# Patient Record
Sex: FEMALE | Race: WHITE | NOT HISPANIC OR LATINO | Employment: UNEMPLOYED | URBAN - METROPOLITAN AREA
[De-identification: names, ages, dates, MRNs, and addresses within clinical notes are randomized per-mention and may not be internally consistent; named-entity substitution may affect disease eponyms.]

---

## 2023-09-08 ENCOUNTER — HOSPITAL ENCOUNTER (OUTPATIENT)
Dept: DATA CONVERSION | Facility: HOSPITAL | Age: 53
Discharge: HOME | End: 2023-09-08

## 2023-09-08 DIAGNOSIS — Z86.73 PERSONAL HISTORY OF TRANSIENT ISCHEMIC ATTACK (TIA), AND CEREBRAL INFARCTION WITHOUT RESIDUAL DEFICITS: ICD-10-CM
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
## 2023-09-08 DIAGNOSIS — S99.912A UNSPECIFIED INJURY OF LEFT ANKLE, INITIAL ENCOUNTER: ICD-10-CM

## 2023-09-08 DIAGNOSIS — S93.402A SPRAIN OF UNSPECIFIED LIGAMENT OF LEFT ANKLE, INITIAL ENCOUNTER: ICD-10-CM

## 2023-09-08 DIAGNOSIS — L53.9 ERYTHEMATOUS CONDITION, UNSPECIFIED: ICD-10-CM

## 2024-01-15 ENCOUNTER — HOSPITAL ENCOUNTER (EMERGENCY)
Facility: HOSPITAL | Age: 54
Discharge: HOME | End: 2024-01-15
Attending: STUDENT IN AN ORGANIZED HEALTH CARE EDUCATION/TRAINING PROGRAM
Payer: MEDICARE

## 2024-01-15 ENCOUNTER — APPOINTMENT (OUTPATIENT)
Dept: RADIOLOGY | Facility: HOSPITAL | Age: 54
End: 2024-01-15
Payer: MEDICARE

## 2024-01-15 VITALS
SYSTOLIC BLOOD PRESSURE: 144 MMHG | WEIGHT: 260 LBS | OXYGEN SATURATION: 99 % | DIASTOLIC BLOOD PRESSURE: 78 MMHG | HEIGHT: 62 IN | TEMPERATURE: 97.9 F | BODY MASS INDEX: 47.84 KG/M2 | HEART RATE: 78 BPM | RESPIRATION RATE: 17 BRPM
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
## 2024-01-15 DIAGNOSIS — Z86.73 HISTORY OF STROKE: ICD-10-CM

## 2024-01-15 DIAGNOSIS — S09.90XA CLOSED HEAD INJURY, INITIAL ENCOUNTER: Primary | ICD-10-CM

## 2024-01-15 PROCEDURE — 70450 CT HEAD/BRAIN W/O DYE: CPT

## 2024-01-15 PROCEDURE — 99284 EMERGENCY DEPT VISIT MOD MDM: CPT | Mod: 25 | Performed by: STUDENT IN AN ORGANIZED HEALTH CARE EDUCATION/TRAINING PROGRAM

## 2024-01-15 ASSESSMENT — LIFESTYLE VARIABLES
EVER FELT BAD OR GUILTY ABOUT YOUR DRINKING: NO
HAVE YOU EVER FELT YOU SHOULD CUT DOWN ON YOUR DRINKING: NO
HAVE PEOPLE ANNOYED YOU BY CRITICIZING YOUR DRINKING: NO
EVER HAD A DRINK FIRST THING IN THE MORNING TO STEADY YOUR NERVES TO GET RID OF A HANGOVER: NO

## 2024-01-15 ASSESSMENT — PAIN SCALES - GENERAL
PAINLEVEL_OUTOF10: 3
PAINLEVEL_OUTOF10: 3

## 2024-01-15 ASSESSMENT — PAIN - FUNCTIONAL ASSESSMENT
PAIN_FUNCTIONAL_ASSESSMENT: 0-10
PAIN_FUNCTIONAL_ASSESSMENT: 0-10

## 2024-01-15 ASSESSMENT — PAIN DESCRIPTION - LOCATION
LOCATION: FINGER (COMMENT WHICH ONE)
LOCATION: FINGER (COMMENT WHICH ONE)

## 2024-01-15 ASSESSMENT — COLUMBIA-SUICIDE SEVERITY RATING SCALE - C-SSRS
2. HAVE YOU ACTUALLY HAD ANY THOUGHTS OF KILLING YOURSELF?: NO
6. HAVE YOU EVER DONE ANYTHING, STARTED TO DO ANYTHING, OR PREPARED TO DO ANYTHING TO END YOUR LIFE?: NO
1. IN THE PAST MONTH, HAVE YOU WISHED YOU WERE DEAD OR WISHED YOU COULD GO TO SLEEP AND NOT WAKE UP?: NO

## 2024-01-15 ASSESSMENT — PAIN DESCRIPTION - ORIENTATION: ORIENTATION: RIGHT

## 2024-01-16 NOTE — ED PROVIDER NOTES
HPI   Chief Complaint   Patient presents with    Fall         This is a 53-year-old female with past medical history of  CVA years ago with persisting left-sided weakness presenting to the emergency department after a fall.   Patient states that she was in her kitchen and was trying to feed her cat.  She states that she mechanically tripped over her walker and fell back.  She states on the way down she hit the back of her head  on a ledge.  She states that she has no loss of consciousness.  She denies dizziness or lightheadedness prior to the fall.  She does take aspirin 325 mg daily.  Due to her left-sided weakness that is not new, she could not get up after the fall.  She typically uses a walker at home.  When her  came home and saw that she could not get up, he did call EMS.  EMS recommended patient come to the emergency department due to hitting her head and taking aspirin.  Patient states that she has no pain currently.  She does not have any headaches, visual disturbances, difficulty speaking, or pain in her upper or lower extremities bilaterally.  No back pain, chest pain, or abdominal pain.  She states that she was on the ground for an hour or so.          Please see HPI for pertinent positive and negative ROS.                   Nhi Coma Scale Score: 15                  Patient History   Past Medical History:   Diagnosis Date    Stroke (CMS/[facility])      History reviewed. No pertinent surgical history.  No family history on file.  Social History     Tobacco Use    Smoking status: Never    Smokeless tobacco: Never   Substance Use Topics    Alcohol use: Yes    Drug use: Never       Physical Exam   ED Triage Vitals [01/15/24 1936]   Temp Heart Rate Resp BP   36.6 °C (97.9 °F) 95 20 136/90      SpO2 Temp Source Heart Rate Source Patient Position   97 % Oral Monitor Sitting      BP Location FiO2 (%)     Right arm --       Physical Exam  GENERAL APPEARANCE: Awake and alert. No acute distress.   VITAL SIGNS:  As per the nurses' triage record.  HEENT: Normocephalic, atraumatic. Extraocular muscles are intact. Conjunctiva are pink. Negative scleral icterus. Mucous membranes are moist. Tongue in the midline. Oropharynx clear, uvula midline.   Orbital bones grossly intact without ecchymosis or tenderness to palpation bilaterally. TM grey and intact bilateral without hemotympanum. No nasal gross abnormalities. No septal hematoma.  No midface instability.  No facial abrasions.  NECK: Soft, nontender and supple, full gross ROM, no meningeal signs. No tenderness to palpation over cervical spinous processes.  No bony step-offs appreciated.  No cervical paraspinal muscle tenderness bilaterally.   CHEST: Nontender to palpation. Clear to auscultation bilaterally. No rales, rhonchi, or wheezing. Symmetric rise and fall of chest wall.   HEART: Clear S1 and S2. Regular rate and rhythm. No murmurs appreciated on auscultation.  Strong and equal pulses in the extremities.  ABDOMEN: Soft, nontender, nondistended, positive bowel sounds, no palpable masses.  MUSCULOSKELETAL: The calves are nontender to palpation. Full gross active range of motion.   No tenderness palpation over thoracic or lumbar paraspinal muscles.  No tenderness palpation over thoracic or lumbar midline.  No bony step-offs appreciated on palpitation.  NEUROLOGICAL: Awake, alert and oriented x 3. Motor power intact in the upper and lower extremities. Sensation is intact to light touch in the upper and lower extremities. Patient answering questions appropriately.   Cranial nerves II through XII grossly intact bilaterally.  IMMUNOLOGICAL: No lymphatic streaking noted  DERMATOLOGIC: Warm and dry without petechiae, rashes, or ecchymosis noted on visible skin.   PYSCH: Cooperative with appropriate mood and affect.  ED Course & MDM   ED Course as of 01/15/24 2128   Mon Chet 15, 2024   2001 CT head wo IV contrast  IMPRESSION:  Status post bilateral parietal craniotomies with  encephalomalacia  seen bilaterally unchanged since the prior study.      No acute intracranial process.   [JM]      ED Course User Index  [ORA] Rosita Mendoza MD         Diagnoses as of 01/15/24 2128   Fall, initial encounter   Closed head injury, initial encounter   History of stroke       Medical Decision Making  Parts of this chart have been completed using voice recognition software. Please excuse any errors of transcription.  My thought process and reason for plan has been formulated from the time that I saw the patient until the time of disposition and is not specific to one specific moment during their visit and furthermore my MDM encompasses this entire chart and not only this text box.      HPI: Detailed above.    Exam: A medically appropriate exam performed, outlined above, given the known history and presentation.    History obtained from:   Patient    Social Determinants of Health considered during this visit:  lives at home  with her .    Medications given during visit:  Medications - No data to display     Diagnostic/tests  Labs Reviewed - No data to display   CT head wo IV contrast   Final Result   Status post bilateral parietal craniotomies with encephalomalacia   seen bilaterally unchanged since the prior study.        No acute intracranial process.        Signed by: Adela Spain 1/15/2024 7:44 PM   Dictation workstation:   [id number]           Considerations/further MDM:    Patient was seen in conjucntion with my supervising physician,  Dr. Sandoval. Please refer to her note.      I have considered and evaluated for intracranial hemorrhage, subdural hematoma, epidural hematoma, laceration of the scalp.   Patient also had pain over her right index finger.  She has full flexion and extension of right finger without any pain.  With palpation over the right index finger there is no tenderness to palpation.  There is no ecchymosis or swelling.  She states it feels like it is swollen  but it is not painful when I touch it.  Do not suspect fracture, no indication for x-ray at this time.      CT scan of head without IV contrast shows no acute intracranial process.  Status post bilateral parietal craniotomies with encephalomalacia seen bilaterally unchanged from prior study.  Patient is aware and she has had craniotomy before.   She is neurologically intact.  She has no head pain at time of assessment.  She has no neck pain.  She feels comfortable with being discharged home.  She states that she came due to EMS recommending her to come to the emergency department due to her taking aspirin and hitting her head during the mechanical fall.   Patient was discharged with instructions follow-up with her primary care doctor.  She was told to return to the emergency department immediately with new or worsening symptoms with the onset of new symptoms.    Procedure  Procedures     Zoe Rose PA-C  01/15/24 2128

## 2024-01-16 NOTE — DISCHARGE INSTRUCTIONS
Seen in the emergency department today for a fall.  Your head scan today was negative for any bleeding.  I recommend that you follow-up with your primary care doctor for recheck as needed.

## 2025-03-07 ENCOUNTER — APPOINTMENT (OUTPATIENT)
Dept: RADIOLOGY | Facility: HOSPITAL | Age: 55
End: 2025-03-07
Payer: MEDICARE

## 2025-03-07 ENCOUNTER — HOSPITAL ENCOUNTER (OUTPATIENT)
Facility: HOSPITAL | Age: 55
Setting detail: OBSERVATION
Discharge: HOME | End: 2025-03-12
Attending: STUDENT IN AN ORGANIZED HEALTH CARE EDUCATION/TRAINING PROGRAM | Admitting: INTERNAL MEDICINE
Payer: MEDICARE

## 2025-03-07 DIAGNOSIS — K59.00 CONSTIPATION, UNSPECIFIED CONSTIPATION TYPE: ICD-10-CM
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
## 2025-03-07 DIAGNOSIS — R53.1 WEAKNESS: ICD-10-CM

## 2025-03-07 DIAGNOSIS — I10 HYPERTENSION, UNSPECIFIED TYPE: ICD-10-CM

## 2025-03-07 DIAGNOSIS — F41.9 ANXIETY: ICD-10-CM

## 2025-03-07 DIAGNOSIS — S09.90XA CLOSED HEAD INJURY, INITIAL ENCOUNTER: ICD-10-CM

## 2025-03-07 LAB
ALBUMIN SERPL BCP-MCNC: 3.9 G/DL (ref 3.4–5)
ALP SERPL-CCNC: 82 U/L (ref 33–110)
ALT SERPL W P-5'-P-CCNC: 26 U/L (ref 7–45)
ANION GAP SERPL CALCULATED.3IONS-SCNC: 13 MMOL/L (ref 10–20)
APPEARANCE UR: CLEAR
AST SERPL W P-5'-P-CCNC: 15 U/L (ref 9–39)
BASOPHILS # BLD AUTO: 0.05 X10*3/UL (ref 0–0.1)
BASOPHILS NFR BLD AUTO: 0.5 %
BILIRUB SERPL-MCNC: 0.4 MG/DL (ref 0–1.2)
BILIRUB UR STRIP.AUTO-MCNC: NEGATIVE MG/DL
BUN SERPL-MCNC: 12 MG/DL (ref 6–23)
CALCIUM SERPL-MCNC: 9.1 MG/DL (ref 8.6–10.3)
CHLORIDE SERPL-SCNC: 104 MMOL/L (ref 98–107)
CO2 SERPL-SCNC: 25 MMOL/L (ref 21–32)
COLOR UR: NORMAL
CREAT SERPL-MCNC: 0.88 MG/DL (ref 0.5–1.05)
EGFRCR SERPLBLD CKD-EPI 2021: 79 ML/MIN/1.73M*2
EOSINOPHIL # BLD AUTO: 0.2 X10*3/UL (ref 0–0.7)
EOSINOPHIL NFR BLD AUTO: 2 %
ERYTHROCYTE [DISTWIDTH] IN BLOOD BY AUTOMATED COUNT: 15.8 % (ref 11.5–14.5)
GLUCOSE SERPL-MCNC: 88 MG/DL (ref 74–99)
GLUCOSE UR STRIP.AUTO-MCNC: NORMAL MG/DL
HCT VFR BLD AUTO: 39.4 % (ref 36–46)
HGB BLD-MCNC: 13.1 G/DL (ref 12–16)
IMM GRANULOCYTES # BLD AUTO: 0.02 X10*3/UL (ref 0–0.7)
IMM GRANULOCYTES NFR BLD AUTO: 0.2 % (ref 0–0.9)
KETONES UR STRIP.AUTO-MCNC: NEGATIVE MG/DL
LEUKOCYTE ESTERASE UR QL STRIP.AUTO: NEGATIVE
LYMPHOCYTES # BLD AUTO: 2.71 X10*3/UL (ref 1.2–4.8)
LYMPHOCYTES NFR BLD AUTO: 27.7 %
MAGNESIUM SERPL-MCNC: 2.01 MG/DL (ref 1.6–2.4)
MCH RBC QN AUTO: 30.4 PG (ref 26–34)
MCHC RBC AUTO-ENTMCNC: 33.2 G/DL (ref 32–36)
MCV RBC AUTO: 91 FL (ref 80–100)
MONOCYTES # BLD AUTO: 0.83 X10*3/UL (ref 0.1–1)
MONOCYTES NFR BLD AUTO: 8.5 %
NEUTROPHILS # BLD AUTO: 5.98 X10*3/UL (ref 1.2–7.7)
NEUTROPHILS NFR BLD AUTO: 61.1 %
NITRITE UR QL STRIP.AUTO: NEGATIVE
NRBC BLD-RTO: 0 /100 WBCS (ref 0–0)
PH UR STRIP.AUTO: 6 [PH]
PLATELET # BLD AUTO: 309 X10*3/UL (ref 150–450)
POTASSIUM SERPL-SCNC: 4.2 MMOL/L (ref 3.5–5.3)
PROT SERPL-MCNC: 7 G/DL (ref 6.4–8.2)
PROT UR STRIP.AUTO-MCNC: NEGATIVE MG/DL
RBC # BLD AUTO: 4.31 X10*6/UL (ref 4–5.2)
RBC # UR STRIP.AUTO: NEGATIVE MG/DL
SODIUM SERPL-SCNC: 138 MMOL/L (ref 136–145)
SP GR UR STRIP.AUTO: 1.01
UROBILINOGEN UR STRIP.AUTO-MCNC: NORMAL MG/DL
WBC # BLD AUTO: 9.8 X10*3/UL (ref 4.4–11.3)

## 2025-03-07 PROCEDURE — 71045 X-RAY EXAM CHEST 1 VIEW: CPT | Mod: FOREIGN READ | Performed by: RADIOLOGY

## 2025-03-07 PROCEDURE — 70450 CT HEAD/BRAIN W/O DYE: CPT | Performed by: RADIOLOGY

## 2025-03-07 PROCEDURE — 70450 CT HEAD/BRAIN W/O DYE: CPT

## 2025-03-07 PROCEDURE — 85025 COMPLETE CBC W/AUTO DIFF WBC: CPT

## 2025-03-07 PROCEDURE — 83735 ASSAY OF MAGNESIUM: CPT

## 2025-03-07 PROCEDURE — 99285 EMERGENCY DEPT VISIT HI MDM: CPT | Mod: 25 | Performed by: STUDENT IN AN ORGANIZED HEALTH CARE EDUCATION/TRAINING PROGRAM

## 2025-03-07 PROCEDURE — G0378 HOSPITAL OBSERVATION PER HR: HCPCS

## 2025-03-07 PROCEDURE — 72170 X-RAY EXAM OF PELVIS: CPT | Mod: FOREIGN READ | Performed by: RADIOLOGY

## 2025-03-07 PROCEDURE — 71045 X-RAY EXAM CHEST 1 VIEW: CPT

## 2025-03-07 PROCEDURE — 36415 COLL VENOUS BLD VENIPUNCTURE: CPT

## 2025-03-07 PROCEDURE — 72170 X-RAY EXAM OF PELVIS: CPT

## 2025-03-07 PROCEDURE — 80053 COMPREHEN METABOLIC PANEL: CPT

## 2025-03-07 PROCEDURE — 81003 URINALYSIS AUTO W/O SCOPE: CPT

## 2025-03-07 RX ORDER — BETAINE 1 G/G
POWDER, FOR SOLUTION ORAL 2 TIMES DAILY
COMMUNITY
Start: 2024-06-07

## 2025-03-07 RX ORDER — GUAIFENESIN 600 MG/1
600 TABLET, EXTENDED RELEASE ORAL EVERY 12 HOURS PRN
Status: DISCONTINUED | OUTPATIENT
Start: 2025-03-07 | End: 2025-03-12 | Stop reason: HOSPADM

## 2025-03-07 RX ORDER — LANOLIN ALCOHOL/MO/W.PET/CERES
300 CREAM (GRAM) TOPICAL DAILY
Status: DISCONTINUED | OUTPATIENT
Start: 2025-03-08 | End: 2025-03-12 | Stop reason: HOSPADM

## 2025-03-07 RX ORDER — CARVEDILOL 12.5 MG/1
12.5 TABLET ORAL 2 TIMES DAILY
Status: DISCONTINUED | OUTPATIENT
Start: 2025-03-07 | End: 2025-03-12 | Stop reason: HOSPADM

## 2025-03-07 RX ORDER — GUAIFENESIN/DEXTROMETHORPHAN 100-10MG/5
5 SYRUP ORAL EVERY 4 HOURS PRN
Status: DISCONTINUED | OUTPATIENT
Start: 2025-03-07 | End: 2025-03-12 | Stop reason: HOSPADM

## 2025-03-07 RX ORDER — HEPARIN SODIUM 5000 [USP'U]/ML
7500 INJECTION, SOLUTION INTRAVENOUS; SUBCUTANEOUS EVERY 8 HOURS SCHEDULED
Status: DISCONTINUED | OUTPATIENT
Start: 2025-03-07 | End: 2025-03-12 | Stop reason: HOSPADM

## 2025-03-07 RX ORDER — BUSPIRONE HYDROCHLORIDE 10 MG/1
10 TABLET ORAL 2 TIMES DAILY
COMMUNITY
Start: 2024-03-04

## 2025-03-07 RX ORDER — TALC
6 POWDER (GRAM) TOPICAL NIGHTLY
Status: DISCONTINUED | OUTPATIENT
Start: 2025-03-07 | End: 2025-03-12 | Stop reason: HOSPADM

## 2025-03-07 RX ORDER — FLUTICASONE PROPIONATE 50 MCG
1 SPRAY, SUSPENSION (ML) NASAL EVERY 12 HOURS PRN
Status: DISCONTINUED | OUTPATIENT
Start: 2025-03-07 | End: 2025-03-12 | Stop reason: HOSPADM

## 2025-03-07 RX ORDER — LAMOTRIGINE 100 MG/1
100 TABLET ORAL NIGHTLY
Status: DISCONTINUED | OUTPATIENT
Start: 2025-03-07 | End: 2025-03-12 | Stop reason: HOSPADM

## 2025-03-07 RX ORDER — ACETAMINOPHEN 650 MG/1
650 SUPPOSITORY RECTAL EVERY 4 HOURS PRN
Status: DISCONTINUED | OUTPATIENT
Start: 2025-03-07 | End: 2025-03-12 | Stop reason: HOSPADM

## 2025-03-07 RX ORDER — ASCORBIC ACID 500 MG
500 TABLET ORAL DAILY
COMMUNITY
Start: 2024-06-08

## 2025-03-07 RX ORDER — FOLIC ACID 0.8 MG
800 TABLET ORAL 2 TIMES DAILY
COMMUNITY
Start: 2024-03-04

## 2025-03-07 RX ORDER — LANOLIN ALCOHOL/MO/W.PET/CERES
500 CREAM (GRAM) TOPICAL 2 TIMES DAILY
Status: DISCONTINUED | OUTPATIENT
Start: 2025-03-07 | End: 2025-03-07

## 2025-03-07 RX ORDER — ALBUTEROL SULFATE 90 UG/1
1-2 INHALANT RESPIRATORY (INHALATION) EVERY 4 HOURS PRN
COMMUNITY

## 2025-03-07 RX ORDER — ASPIRIN 325 MG
325 TABLET ORAL DAILY
Status: DISCONTINUED | OUTPATIENT
Start: 2025-03-08 | End: 2025-03-12 | Stop reason: HOSPADM

## 2025-03-07 RX ORDER — ACETAMINOPHEN 325 MG/1
650 TABLET ORAL EVERY 6 HOURS PRN
COMMUNITY
Start: 2024-03-04

## 2025-03-07 RX ORDER — ASCORBIC ACID 500 MG
500 TABLET ORAL DAILY
Status: DISCONTINUED | OUTPATIENT
Start: 2025-03-08 | End: 2025-03-12 | Stop reason: HOSPADM

## 2025-03-07 RX ORDER — SPIRONOLACTONE 25 MG/1
12.5 TABLET ORAL DAILY
COMMUNITY

## 2025-03-07 RX ORDER — EZETIMIBE 10 MG/1
10 TABLET ORAL
Status: DISCONTINUED | OUTPATIENT
Start: 2025-03-08 | End: 2025-03-12 | Stop reason: HOSPADM

## 2025-03-07 RX ORDER — FLUTICASONE PROPIONATE 50 MCG
1 SPRAY, SUSPENSION (ML) NASAL EVERY 12 HOURS PRN
COMMUNITY
Start: 2024-03-04

## 2025-03-07 RX ORDER — LISINOPRIL 10 MG/1
10 TABLET ORAL NIGHTLY
Status: DISCONTINUED | OUTPATIENT
Start: 2025-03-07 | End: 2025-03-12 | Stop reason: HOSPADM

## 2025-03-07 RX ORDER — CHOLECALCIFEROL (VITAMIN D3) 25 MCG
1000 TABLET ORAL
Status: DISCONTINUED | OUTPATIENT
Start: 2025-03-08 | End: 2025-03-12 | Stop reason: HOSPADM

## 2025-03-07 RX ORDER — HYDROCORTISONE 25 MG/ML
1 LOTION TOPICAL 2 TIMES DAILY
Status: DISCONTINUED | OUTPATIENT
Start: 2025-03-07 | End: 2025-03-12 | Stop reason: HOSPADM

## 2025-03-07 RX ORDER — BUSPIRONE HYDROCHLORIDE 10 MG/1
10 TABLET ORAL 2 TIMES DAILY
Status: DISCONTINUED | OUTPATIENT
Start: 2025-03-07 | End: 2025-03-12 | Stop reason: HOSPADM

## 2025-03-07 RX ORDER — EZETIMIBE 10 MG/1
10 TABLET ORAL
COMMUNITY
Start: 2023-08-10

## 2025-03-07 RX ORDER — DULOXETIN HYDROCHLORIDE 60 MG/1
60 CAPSULE, DELAYED RELEASE ORAL
COMMUNITY
Start: 2024-02-14

## 2025-03-07 RX ORDER — CALCIUM CARBONATE 200(500)MG
1-2 TABLET,CHEWABLE ORAL DAILY PRN
COMMUNITY
Start: 2024-06-07

## 2025-03-07 RX ORDER — SPIRONOLACTONE 25 MG/1
12.5 TABLET ORAL DAILY
Status: DISCONTINUED | OUTPATIENT
Start: 2025-03-08 | End: 2025-03-12 | Stop reason: HOSPADM

## 2025-03-07 RX ORDER — CARVEDILOL 12.5 MG/1
12.5 TABLET ORAL 2 TIMES DAILY
Status: ON HOLD | COMMUNITY
Start: 2024-06-07 | End: 2025-03-12

## 2025-03-07 RX ORDER — ASPIRIN 325 MG
325 TABLET ORAL DAILY
COMMUNITY

## 2025-03-07 RX ORDER — LANOLIN ALCOHOL/MO/W.PET/CERES
200 CREAM (GRAM) TOPICAL NIGHTLY
Status: DISCONTINUED | OUTPATIENT
Start: 2025-03-07 | End: 2025-03-12 | Stop reason: HOSPADM

## 2025-03-07 RX ORDER — DULOXETIN HYDROCHLORIDE 60 MG/1
60 CAPSULE, DELAYED RELEASE ORAL NIGHTLY
Status: DISCONTINUED | OUTPATIENT
Start: 2025-03-07 | End: 2025-03-12 | Stop reason: HOSPADM

## 2025-03-07 RX ORDER — LISINOPRIL 10 MG/1
10 TABLET ORAL NIGHTLY
Status: ON HOLD | COMMUNITY
Start: 2025-02-19 | End: 2025-03-12

## 2025-03-07 RX ORDER — ACETAMINOPHEN 160 MG/5ML
650 SOLUTION ORAL EVERY 4 HOURS PRN
Status: DISCONTINUED | OUTPATIENT
Start: 2025-03-07 | End: 2025-03-12 | Stop reason: HOSPADM

## 2025-03-07 RX ORDER — TALC
6 POWDER (GRAM) TOPICAL NIGHTLY
COMMUNITY
Start: 2024-03-04

## 2025-03-07 RX ORDER — ACETAMINOPHEN 325 MG/1
650 TABLET ORAL EVERY 4 HOURS PRN
Status: DISCONTINUED | OUTPATIENT
Start: 2025-03-07 | End: 2025-03-12 | Stop reason: HOSPADM

## 2025-03-07 RX ORDER — ATORVASTATIN CALCIUM 80 MG/1
80 TABLET, FILM COATED ORAL NIGHTLY
Status: DISCONTINUED | OUTPATIENT
Start: 2025-03-07 | End: 2025-03-12 | Stop reason: HOSPADM

## 2025-03-07 RX ORDER — FOLIC ACID 0.4 MG
800 TABLET ORAL DAILY
Status: DISCONTINUED | OUTPATIENT
Start: 2025-03-08 | End: 2025-03-12 | Stop reason: HOSPADM

## 2025-03-07 RX ORDER — ACETAMINOPHEN 325 MG/1
650 TABLET ORAL EVERY 6 HOURS PRN
Status: DISCONTINUED | OUTPATIENT
Start: 2025-03-07 | End: 2025-03-07 | Stop reason: SDUPTHER

## 2025-03-07 RX ORDER — VIT C/E/ZN/COPPR/LUTEIN/ZEAXAN 250MG-90MG
1000 CAPSULE ORAL
COMMUNITY
Start: 2024-06-11

## 2025-03-07 RX ORDER — CALCIUM CARBONATE 200(500)MG
1000 TABLET,CHEWABLE ORAL DAILY PRN
Status: DISCONTINUED | OUTPATIENT
Start: 2025-03-07 | End: 2025-03-12 | Stop reason: HOSPADM

## 2025-03-07 RX ORDER — PYRIDOXINE HCL (VITAMIN B6) 100 MG
500 TABLET ORAL 2 TIMES DAILY
COMMUNITY

## 2025-03-07 RX ORDER — ALBUTEROL SULFATE 0.83 MG/ML
3 SOLUTION RESPIRATORY (INHALATION) EVERY 4 HOURS PRN
Status: DISCONTINUED | OUTPATIENT
Start: 2025-03-07 | End: 2025-03-12 | Stop reason: HOSPADM

## 2025-03-07 RX ORDER — HYDROCORTISONE 25 MG/ML
1 LOTION TOPICAL 2 TIMES DAILY
COMMUNITY
Start: 2025-02-19

## 2025-03-07 RX ORDER — BACLOFEN 5 MG/1
5 TABLET ORAL 3 TIMES DAILY
COMMUNITY
Start: 2024-03-04 | End: 2025-09-03

## 2025-03-07 RX ORDER — POLYETHYLENE GLYCOL 3350 17 G/17G
17 POWDER, FOR SOLUTION ORAL DAILY PRN
Status: DISCONTINUED | OUTPATIENT
Start: 2025-03-07 | End: 2025-03-12 | Stop reason: HOSPADM

## 2025-03-07 RX ORDER — ATORVASTATIN CALCIUM 80 MG/1
80 TABLET, FILM COATED ORAL
COMMUNITY
Start: 2024-06-07

## 2025-03-07 RX ORDER — BACLOFEN 10 MG/1
5 TABLET ORAL 3 TIMES DAILY
Status: DISCONTINUED | OUTPATIENT
Start: 2025-03-07 | End: 2025-03-12 | Stop reason: HOSPADM

## 2025-03-07 RX ORDER — LAMOTRIGINE 100 MG/1
100 TABLET ORAL
COMMUNITY
Start: 2024-02-14

## 2025-03-07 ASSESSMENT — LIFESTYLE VARIABLES
EVER FELT BAD OR GUILTY ABOUT YOUR DRINKING: NO
HAVE YOU EVER FELT YOU SHOULD CUT DOWN ON YOUR DRINKING: NO
HAVE PEOPLE ANNOYED YOU BY CRITICIZING YOUR DRINKING: NO
EVER HAD A DRINK FIRST THING IN THE MORNING TO STEADY YOUR NERVES TO GET RID OF A HANGOVER: NO
TOTAL SCORE: 0

## 2025-03-07 ASSESSMENT — COLUMBIA-SUICIDE SEVERITY RATING SCALE - C-SSRS
1. IN THE PAST MONTH, HAVE YOU WISHED YOU WERE DEAD OR WISHED YOU COULD GO TO SLEEP AND NOT WAKE UP?: NO
6. HAVE YOU EVER DONE ANYTHING, STARTED TO DO ANYTHING, OR PREPARED TO DO ANYTHING TO END YOUR LIFE?: NO
2. HAVE YOU ACTUALLY HAD ANY THOUGHTS OF KILLING YOURSELF?: NO

## 2025-03-07 ASSESSMENT — PAIN SCALES - GENERAL
PAINLEVEL_OUTOF10: 0 - NO PAIN
PAINLEVEL_OUTOF10: 0 - NO PAIN

## 2025-03-07 ASSESSMENT — PAIN - FUNCTIONAL ASSESSMENT: PAIN_FUNCTIONAL_ASSESSMENT: 0-10

## 2025-03-07 NOTE — ED TRIAGE NOTES
Pt from home via EMS for c/o for multiple falls. States she no longer feels safe at home and would like to go to rehab.

## 2025-03-07 NOTE — PROGRESS NOTES
Mamta Paredes is a 54 y.o. female admitted for No Principal Problem currently listed. Pharmacy reviewed the patient's [id number] medications and allergies for accuracy.    Medications ADDED:  DULoxetine (Cymbalta) 60 mg DR capsule  acetaminophen (Tylenol) 325 mg tablet  albuterol 90 mcg/actuation inhaler  ascorbic acid (Vitamin C) 500 mg tablet  aspirin 325 mg tablet  atorvastatin (Lipitor) 80 mg tablet  baclofen (Lioresal) 5 mg tablet  betaine 1 gram/scoop powder  busPIRone (Buspar) 10 mg tablet  carvedilol (Coreg) 12.5 mg tablet  calcium carbonate (Tums) 200 mg calcium chewable tablet  cholecalciferol (Vitamin D-3) 25 MCG (1000 UT) capsule  ezetimibe (Zetia) 10 mg tablet  fluticasone (Flonase) 50 mcg/actuation nasal spray  folic acid (Folvite) 800 mcg tablet  hydrocortisone 2.5 % lotion  lamoTRIgine (LaMICtal) 100 mg tablet  lisinopril 10 mg tablet  melatonin 3 mg tablet  omega-3/dha/epa/fish oil (OMEGA-3 FISH OIL ORAL)  pyridoxine (Vitamin B-6) 100 mg tablet  spironolactone (Aldactone) 25 mg tablet  Medications CHANGED:  None  Medications REMOVED:   None     The list below reflects the updated PTA list. Comments regarding how patient may be taking medications differently can be found in the Admit Orders Activity  Prior to Admission Medications   Prescriptions Last Dose Informant   DULoxetine (Cymbalta) 60 mg DR capsule 3/6/2025 Bedtime Self   Sig: Take 1 capsule (60 mg) by mouth once daily.   acetaminophen (Tylenol) 325 mg tablet PRN Self   Sig: Take 2 tablets (650 mg) by mouth every 6 hours if   needed.   albuterol 90 mcg/actuation inhaler PRN Self   Sig: Inhale 1-2 puffs every 4 hours if needed for wheezing   or shortness of breath.   ascorbic acid (Vitamin C) 500 mg tablet 3/7/2025 Morning Self   Sig: Take 1 tablet (500 mg) by mouth once daily.   aspirin 325 mg tablet 3/7/2025 Morning Self   Sig: Take 1 tablet (325 mg) by mouth once daily.   atorvastatin (Lipitor) 80 mg tablet 3/6/2025 Bedtime Self    Sig: Take 1 tablet (80 mg) by mouth once daily.   baclofen (Lioresal) 5 mg tablet 3/7/25  Morning Self   Sig: Take 1 tablet (5 mg) by mouth 3 times a day.   betaine 1 gram/scoop powder 3/7/2025 Morning Self   Sig: Take by mouth twice a day. Take 5 scoops daily.   Take 3 scoops in the morning and 2 scoops in the evening   busPIRone (Buspar) 10 mg tablet 3/7/2025 Morning Self   Sig: Take 1 tablet (10 mg) by mouth twice a day.   calcium carbonate (Tums) 200 mg calcium chewable tablet 3/7/2025 Morning Self   Sig: Chew 1-2 tablets (500-1,000 mg) once daily as   needed for heartburn.   carvedilol (Coreg) 12.5 mg tablet 3/7/2025 Morning Self   Sig: Take 1 tablet (12.5 mg) by mouth twice a day.   Take with meals   cholecalciferol (Vitamin D-3) 25 MCG (1000 UT) capsule 3/7/2025 Morning Self   Sig: Take 1 capsule (25 mcg) by mouth once daily.   ezetimibe (Zetia) 10 mg tablet 3/7/2025 Morning Self   Sig: Take 1 tablet (10 mg) by mouth once daily.   fluticasone (Flonase) 50 mcg/actuation nasal spray PRN Self   Sig: Administer 1 spray into each nostril every 12 hours   if needed.   folic acid (Folvite) 800 mcg tablet 3/7/2025 Morning Self   Sig: Take 1 tablet (800 mcg) by mouth twice a day.   hydrocortisone 2.5 % lotion 3/6/2025 Evening Self   Sig: Apply 1 Application topically 2 times a day.   lamoTRIgine (LaMICtal) 100 mg tablet 3/6/2025 Bedtime Self   Sig: Take 1 tablet (100 mg) by mouth once daily.   lisinopril 10 mg tablet 3/6/2025 Bedtime Self   Sig: Take 1 tablet (10 mg) by mouth once daily at bedtime.   melatonin 3 mg tablet 3/6/2025 Bedtime Self   Sig: Take 2 tablets (6 mg) by mouth once daily at bedtime.   omega-3/dha/epa/fish oil (OMEGA-3 FISH OIL ORAL) Past Week Self   Sig: Take 2,000 mg by mouth once daily.   pyridoxine (Vitamin B-6) 100 mg tablet 3/7/2025 Morning Self   Sig: Take 5 tablets (500 mg) by mouth 2 times a day.   Take 3 tablets every morning and 2 tablets every evening   spironolactone (Aldactone) 25  mg tablet 3/7/2025 Morning Self   Sig: Take 0.5 tablets (12.5 mg) by mouth once daily.      Facility-Administered Medications: None        The list below reflects the updated allergy list. Please review each documented allergy for additional clarification and justification.  Allergies  Reviewed by Maurilio Monet on 3/7/2025        Severity Reactions Comments    Nitrous Oxide Not Specified Hives     Xanax [alprazolam] Not Specified GI Upset             Pharmacy has been updated to [facility], [place], operated by [facility].    Sources used to complete the med history include:   Patient interview, good historian, dispense report, care everywhere, chart review history    Below are additional concerns with the patient's PTA list.  None    Maurilio Monet, [place]rel[place]  Please reach out via [employer] Secure Chat for questions

## 2025-03-07 NOTE — ED PROVIDER NOTES
"The patient was seen in conjunction with the advanced practice provider, and I performed a substantive portion of the encounter. The following is my supervisory note.    History:  Patient presents ED for multiple falls and generalized weakness.  Notes recent fall couple days ago and hit her head but denies missing LOC.  Denies any AC/AP medication use.  Notes sniffily worsening strength and inability to care for herself.  Notes she had a mechanical fall today secondary to her postop shoe complicated by her left-sided residual weakness from prior multiple CVAs.  Denied any prodromal symptoms of headache, vision changes, cardiopulmonary symptoms.  Denies any GI/ symptoms.  Has not appreciating feck symptoms to include fever/chills or URI symptoms.  Denies any other significant systemic symptoms or complaints.  Denies any significant pain or injuries.    VS:  BP (!) 131/44 (BP Location: Right arm, Patient Position: Sitting) Comment: rn [place] notified  Pulse 78   Temp 36.5 °C (97.7 °F) (Oral)   Resp 19   Ht 1.6 m (5' 3\")   Wt 122 kg (268 lb)   SpO2 98%   BMI 47.47 kg/m²      Physical exam:  Airway: patent  Breathing: bilateral breath sounds  Circulation: bilateral radial/DP/PT, central carotid/femoral  GCS: 15    CONST: alert, normal appearance, no acute distress, obese, does not appear ill/toxic  HEAD: normocephalic, atraumatic, midface stable  NECK: trachea midline, no midline C-spine tenderness or appreciable spinous process step-offs/deformities  ENT: no septal hematoma or epistaxis, no perioral/intraoral injuries or dental malocclusion  EYES: Pupils 4-2 mm, no periorbital ecchymosis, periorbital rims intact, EOMI, no appreciable ocular entrapment  CV: RRR, no murmurs, 1+ equal/symmetrical pulses x4, chest wall non-tender   PULM: CTAB, no respiratory distress, not requiring supplemental O2, equal/symmetrical chest wall rise, no appreciable paradoxical movement  ABD: soft, " protuberant/non-tender/non-distended, no mass, no evidence of ecchymosis  MSK: pelvis stable to compression and without pain, no obvious gross extremity trauma/deformities and no appreciable pain with PROM x4 (prox/mid/distal joints and extremity)  SKIN: no evidence of wounds/lacerations, no ecchymosis, warm/dry, no pallor  NEURO: A&Ox4, gross strength/motor/sensation intact x2 (RUE/RLE), residual left-sided hemiparesis  PSYCH: Bright affect      I personally reviewed and interpreted the following studies: EKG is N/A, labs are significant for unremarkable/noncontributory, images are notable for CTH no evidence of traumatic ICH or calvarium fracture .      MDM:  Patient presented to the ED for multiple falls associated with left-sided weakness from prior CVAs and feels unsafe to be at home and care for self.  Concerning PMHx of multiple CVAs, obesity, CHF, NICM, homocystinuria, HTN, HLD.    Per Chart Review: [facility] home visits on 3/5/2025, summary significant for endorsement of difficulty with ambulation and concern for safety, history of multiple falls, unremarkable/noncontributory exam, plans to continue home physical therapy and obtain DEXA scan.    Assessment/evaluation consistent with significant physical deconditioning and weakness complicated by report of multiple falls and significant left-sided deficits from prior CVA. No concerning history, clinical evidence/work-up, or exam findings for the concerning differentials of significant electrolyte/metabolic derangement, UTI/cystitis, significant dehydration, ischemic CVA, traumatic ICH, axial/appendicular skeletal trauma. These conditions have been thoroughly evaluated and determined to be sufficiently unlikely to be the etiology of patient's presenting symptoms.       ED Course/Diagnosis:  ED Course as of 03/09/25 1924   Fri Mar 07, 2025   1807 I did speak with care coordination on this patient and they state that they will put it on the  list for a Team member to look at tomorrow. [AR]   1842 I spoke with admitting provider, Dr. Ling.  After discussion, patient will be admitted for further management of weakness and plan will be for placement. [AR]      ED Course User Index  [AR] Anil Elise PA-C         Diagnoses as of 03/09/25 1924   Fall, initial encounter   Closed head injury, initial encounter   Weakness       1. Fall, initial encounter        2. Closed head injury, initial encounter        3. Weakness        4. Anxiety  hydrOXYzine HCL (Atarax) 25 mg tablet               Olman Evangelista MD  03/09/25 1925

## 2025-03-07 NOTE — ED PROVIDER NOTES
HPI   Chief Complaint   Patient presents with    Fall       Patient is a 54-year-old female with past medical history of stroke and left-sided deficits presenting via EMS from home with fall.  She states that she was recently discharged from a skilled nursing facility on 20th of February and has only been home for roughly 2 to 2-1/2 weeks.  She states that she has had 3 falls this week.  She does have PT and OT at home but she does not feel safe at home.  She states that she cannot ambulate with the postop shoe.  She requires a postop shoe because of a previous fall where she broke her toe.  Her  lives with her but goes to work during the day and she has no one to care for her.  She does not feel safe at home and would like to go for rehab again or potentially assisted living.  States that at one of her recent fall she did hit her head.  She is not on blood thinners.  States that she needs a walker at home.  Patient denies fevers, chills, cough, sore throat, runny nose, chest pain, shortness of breath, abdominal pain, nausea, vomiting, diarrhea or urinary complaints.              Patient History   Past Medical History:   Diagnosis Date    Stroke (Multi)      No past surgical history on file.  No family history on file.  Social History     Tobacco Use    Smoking status: Never    Smokeless tobacco: Never   Substance Use Topics    Alcohol use: Yes    Drug use: Never       Physical Exam   ED Triage Vitals   Temperature Heart Rate Respirations BP   03/07/25 1606 03/07/25 1608 03/07/25 1608 03/07/25 1608   36.6 °C (97.8 °F) 87 19 116/60      Pulse Ox Temp Source Heart Rate Source Patient Position   03/07/25 1608 03/07/25 1606 03/07/25 1608 03/07/25 1608   96 % Oral Monitor Lying      BP Location FiO2 (%)     -- --             Physical Exam  Vitals and nursing note reviewed.   Constitutional:       Appearance: She is well-developed.      Comments: Awake, laying in examination bed   HENT:      Head: Normocephalic and  atraumatic.      Nose: Nose normal.      Mouth/Throat:      Mouth: Mucous membranes are moist.      Pharynx: Oropharynx is clear.   Eyes:      Extraocular Movements: Extraocular movements intact.      Conjunctiva/sclera: Conjunctivae normal.      Pupils: Pupils are equal, round, and reactive to light.   Cardiovascular:      Rate and Rhythm: Normal rate and regular rhythm.      Pulses: Normal pulses.      Heart sounds: Normal heart sounds. No murmur heard.  Pulmonary:      Effort: Pulmonary effort is normal. No respiratory distress.      Breath sounds: Normal breath sounds.   Abdominal:      General: Abdomen is flat.      Palpations: Abdomen is soft.      Tenderness: There is no abdominal tenderness.   Musculoskeletal:         General: No swelling. Normal range of motion.      Cervical back: Normal range of motion and neck supple.      Comments: Postop shoe on left foot   Skin:     General: Skin is warm and dry.      Capillary Refill: Capillary refill takes less than 2 seconds.   Neurological:      General: No focal deficit present.      Mental Status: She is alert and oriented to person, place, and time.   Psychiatric:         Mood and Affect: Mood normal.         Behavior: Behavior normal.           ED Course & MDM   ED Course as of 03/07/25 1844   Fri Mar 07, 2025   1807 I did speak with care coordination on this patient and they state that they will put it on the list for a Team member to look at tomorrow. [AR]   1842 I spoke with admitting provider, Dr. Ling.  After discussion, patient will be admitted for further management of weakness and plan will be for placement. [AR]      ED Course User Index  [AR] Anil Elise PA-C         Diagnoses as of 03/07/25 1844   Fall, initial encounter   Closed head injury, initial encounter   Weakness                 No data recorded     Hamilton Coma Scale Score: 14 (03/07/25 1740 : Karen Higgins RN)       NIH Stroke Scale: 0 (03/07/25 1843 : Anil Elise  ZARA)                   Medical Decision Making  Patient is a 54-year-old female past medical history of stroke and left-sided deficits presenting via EMS from home with a fall.  Lab work, urine, imaging ordered.  Conditions considered include but are not limited to: Chronic weakness, anemia, electrolyte abnormality, UTI, fracture, contusion, intracranial pathology.  NIH of 0.  Test of skew.  There is no cerebral ataxia present.    I saw this patient in conjunction with Dr. Evangelista.  On physical exam, I do not appreciate significant one-sided deficits.  CBC is without leukocytosis or anemia.  CMP without significant electrolyte abnormality or renal impairment.  Magnesium within normal limit.  X-ray of the pelvis without acute findings.  Chest x-ray without acute cardiopulmonary process.  Head CT without acute findings.  Currently pending urine sample.    I spoke with admitting provider, Dr. Ling.  After our discussion, patient will be admitted for further management of weakness and inability to safely ambulate at home.  I did have discussed with patient that she would likely benefit from assisted living rather than rehabilitation so she can get more direct care.  Patient states that she will consider this.  As I deemed necessary from the patient's history, physical, laboratory and imaging findings as well as ED course, I considered the above listed diagnoses.    Portions of this note made with Dragon software, please be mindful of potential grammatical errors.      Labs Reviewed   CBC WITH AUTO DIFFERENTIAL - Abnormal       Result Value    WBC 9.8      nRBC 0.0      RBC 4.31      Hemoglobin 13.1      Hematocrit 39.4      MCV 91      MCH 30.4      MCHC 33.2      RDW 15.8 (*)     Platelets 309      Neutrophils % 61.1      Immature Granulocytes %, Automated 0.2      Lymphocytes % 27.7      Monocytes % 8.5      Eosinophils % 2.0      Basophils % 0.5      Neutrophils Absolute 5.98      Immature Granulocytes Absolute,  Automated 0.02      Lymphocytes Absolute 2.71      Monocytes Absolute 0.83      Eosinophils Absolute 0.20      Basophils Absolute 0.05     COMPREHENSIVE METABOLIC PANEL - Normal    Glucose 88      Sodium 138      Potassium 4.2      Chloride 104      Bicarbonate 25      Anion Gap 13      Urea Nitrogen 12      Creatinine 0.88      eGFR 79      Calcium 9.1      Albumin 3.9      Alkaline Phosphatase 82      Total Protein 7.0      AST 15      Bilirubin, Total 0.4      ALT 26     MAGNESIUM - Normal    Magnesium 2.01     URINALYSIS WITH REFLEX CULTURE AND MICROSCOPIC    Narrative:     The following orders were created for panel order Urinalysis with Reflex Culture and Microscopic.  Procedure                               Abnormality         Status                     ---------                               -----------         ------                     Urinalysis with Reflex C...[[id number]]                                                 Extra Urine Gray Tube[[id number]]                                                         Please view results for these tests on the individual orders.   URINALYSIS WITH REFLEX CULTURE AND MICROSCOPIC   EXTRA URINE GRAY TUBE     CT head wo IV contrast   Final Result   No CT evidence of acute intracranial injury.                  MACRO:   None             Signed by: Oneil Matos 3/7/2025 6:21 PM   Dictation workstation:   [id number]      XR chest 1 view   Final Result   Elevation right hemidiaphragm.   Signed by Mike Murrell,       XR pelvis 1-2 views   Final Result   No evidence for acute fracture or dislocation.   Signed by Raj Yanez MD            Procedure  Procedures     Anil Elise PA-C  03/07/25 1843       Anil Elise PA-C  03/07/25 1841

## 2025-03-07 NOTE — ED NOTES
Received report from Alyssa, patient's  with [facility] home health care. Per , this patient was discharged home on 2/20 from a 23 day stay at [facility]. The patient was started on home health care on 2/22 and PT and OT was initiated at home as well. Per the , this patient has been experiencing frequent falls. The patient has fallen 3 times over the past two days. The patient is complaining of right ankle pain. Alyssa can be reached at [phone].      Cherelle Madrigal, JACKY  03/07/25 7500

## 2025-03-08 LAB
ALBUMIN SERPL BCP-MCNC: 3.5 G/DL (ref 3.4–5)
ALP SERPL-CCNC: 75 U/L (ref 33–110)
ALT SERPL W P-5'-P-CCNC: 24 U/L (ref 7–45)
ANION GAP SERPL CALCULATED.3IONS-SCNC: 12 MMOL/L (ref 10–20)
APPEARANCE UR: CLEAR
AST SERPL W P-5'-P-CCNC: 15 U/L (ref 9–39)
BILIRUB SERPL-MCNC: 0.6 MG/DL (ref 0–1.2)
BILIRUB UR STRIP.AUTO-MCNC: NEGATIVE MG/DL
BUN SERPL-MCNC: 11 MG/DL (ref 6–23)
CALCIUM SERPL-MCNC: 8.6 MG/DL (ref 8.6–10.3)
CHLORIDE SERPL-SCNC: 106 MMOL/L (ref 98–107)
CO2 SERPL-SCNC: 27 MMOL/L (ref 21–32)
COLOR UR: NORMAL
CREAT SERPL-MCNC: 0.84 MG/DL (ref 0.5–1.05)
EGFRCR SERPLBLD CKD-EPI 2021: 83 ML/MIN/1.73M*2
ERYTHROCYTE [DISTWIDTH] IN BLOOD BY AUTOMATED COUNT: 15.6 % (ref 11.5–14.5)
GLUCOSE BLD MANUAL STRIP-MCNC: 112 MG/DL (ref 74–99)
GLUCOSE BLD MANUAL STRIP-MCNC: 200 MG/DL (ref 74–99)
GLUCOSE BLD MANUAL STRIP-MCNC: 96 MG/DL (ref 74–99)
GLUCOSE SERPL-MCNC: 103 MG/DL (ref 74–99)
GLUCOSE UR STRIP.AUTO-MCNC: NORMAL MG/DL
HCT VFR BLD AUTO: 35.8 % (ref 36–46)
HGB BLD-MCNC: 12.1 G/DL (ref 12–16)
HOLD SPECIMEN: NORMAL
KETONES UR STRIP.AUTO-MCNC: NEGATIVE MG/DL
LEUKOCYTE ESTERASE UR QL STRIP.AUTO: NEGATIVE
MCH RBC QN AUTO: 30 PG (ref 26–34)
MCHC RBC AUTO-ENTMCNC: 33.8 G/DL (ref 32–36)
MCV RBC AUTO: 89 FL (ref 80–100)
NITRITE UR QL STRIP.AUTO: NEGATIVE
NRBC BLD-RTO: 0 /100 WBCS (ref 0–0)
PH UR STRIP.AUTO: 6 [PH]
PLATELET # BLD AUTO: 277 X10*3/UL (ref 150–450)
POTASSIUM SERPL-SCNC: 4 MMOL/L (ref 3.5–5.3)
PROT SERPL-MCNC: 5.9 G/DL (ref 6.4–8.2)
PROT UR STRIP.AUTO-MCNC: NEGATIVE MG/DL
RBC # BLD AUTO: 4.03 X10*6/UL (ref 4–5.2)
RBC # UR STRIP.AUTO: NEGATIVE MG/DL
SODIUM SERPL-SCNC: 141 MMOL/L (ref 136–145)
SP GR UR STRIP.AUTO: 1.01
UROBILINOGEN UR STRIP.AUTO-MCNC: NORMAL MG/DL
WBC # BLD AUTO: 8 X10*3/UL (ref 4.4–11.3)

## 2025-03-08 PROCEDURE — 96372 THER/PROPH/DIAG INJ SC/IM: CPT | Performed by: INTERNAL MEDICINE

## 2025-03-08 PROCEDURE — 97162 PT EVAL MOD COMPLEX 30 MIN: CPT | Mod: GP

## 2025-03-08 PROCEDURE — 2500000004 HC RX 250 GENERAL PHARMACY W/ HCPCS (ALT 636 FOR OP/ED): Performed by: INTERNAL MEDICINE

## 2025-03-08 PROCEDURE — 97165 OT EVAL LOW COMPLEX 30 MIN: CPT | Mod: GO

## 2025-03-08 PROCEDURE — 81003 URINALYSIS AUTO W/O SCOPE: CPT | Performed by: INTERNAL MEDICINE

## 2025-03-08 PROCEDURE — 85027 COMPLETE CBC AUTOMATED: CPT | Performed by: INTERNAL MEDICINE

## 2025-03-08 PROCEDURE — 84075 ASSAY ALKALINE PHOSPHATASE: CPT | Performed by: INTERNAL MEDICINE

## 2025-03-08 PROCEDURE — 2500000002 HC RX 250 W HCPCS SELF ADMINISTERED DRUGS (ALT 637 FOR MEDICARE OP, ALT 636 FOR OP/ED): Performed by: INTERNAL MEDICINE

## 2025-03-08 PROCEDURE — 82947 ASSAY GLUCOSE BLOOD QUANT: CPT

## 2025-03-08 PROCEDURE — G0378 HOSPITAL OBSERVATION PER HR: HCPCS

## 2025-03-08 PROCEDURE — 2500000001 HC RX 250 WO HCPCS SELF ADMINISTERED DRUGS (ALT 637 FOR MEDICARE OP): Performed by: INTERNAL MEDICINE

## 2025-03-08 PROCEDURE — 36415 COLL VENOUS BLD VENIPUNCTURE: CPT | Performed by: INTERNAL MEDICINE

## 2025-03-08 RX ORDER — HYDROXYZINE HYDROCHLORIDE 25 MG/1
25 TABLET, FILM COATED ORAL EVERY 6 HOURS PRN
Qty: 40 TABLET | Refills: 0 | Status: SHIPPED | OUTPATIENT
Start: 2025-03-08

## 2025-03-08 RX ORDER — HYDROXYZINE HYDROCHLORIDE 25 MG/1
25 TABLET, FILM COATED ORAL EVERY 6 HOURS PRN
Status: DISCONTINUED | OUTPATIENT
Start: 2025-03-08 | End: 2025-03-12 | Stop reason: HOSPADM

## 2025-03-08 RX ADMIN — BACLOFEN 5 MG: 10 TABLET ORAL at 07:31

## 2025-03-08 RX ADMIN — PYRIDOXINE HCL TAB 50 MG 200 MG: 50 TAB at 21:17

## 2025-03-08 RX ADMIN — BUSPIRONE HYDROCHLORIDE 10 MG: 10 TABLET ORAL at 08:57

## 2025-03-08 RX ADMIN — BUSPIRONE HYDROCHLORIDE 10 MG: 10 TABLET ORAL at 21:17

## 2025-03-08 RX ADMIN — ACETAMINOPHEN 650 MG: 325 TABLET, FILM COATED ORAL at 10:27

## 2025-03-08 RX ADMIN — CARVEDILOL 12.5 MG: 12.5 TABLET, FILM COATED ORAL at 21:26

## 2025-03-08 RX ADMIN — DULOXETINE HYDROCHLORIDE 60 MG: 60 CAPSULE, DELAYED RELEASE ORAL at 01:17

## 2025-03-08 RX ADMIN — SPIRONOLACTONE 12.5 MG: 25 TABLET ORAL at 08:57

## 2025-03-08 RX ADMIN — Medication 1000 UNITS: at 07:31

## 2025-03-08 RX ADMIN — ASPIRIN 325 MG: 325 TABLET ORAL at 08:57

## 2025-03-08 RX ADMIN — ATORVASTATIN CALCIUM 80 MG: 80 TABLET, FILM COATED ORAL at 21:16

## 2025-03-08 RX ADMIN — HYDROCORTISONE 1 APPLICATION: 25 LOTION TOPICAL at 21:33

## 2025-03-08 RX ADMIN — BACLOFEN 5 MG: 10 TABLET ORAL at 01:26

## 2025-03-08 RX ADMIN — LAMOTRIGINE 100 MG: 100 TABLET ORAL at 01:26

## 2025-03-08 RX ADMIN — BUSPIRONE HYDROCHLORIDE 10 MG: 10 TABLET ORAL at 01:17

## 2025-03-08 RX ADMIN — LAMOTRIGINE 100 MG: 100 TABLET ORAL at 21:17

## 2025-03-08 RX ADMIN — DULOXETINE HYDROCHLORIDE 60 MG: 60 CAPSULE, DELAYED RELEASE ORAL at 21:17

## 2025-03-08 RX ADMIN — HYDROXYZINE HYDROCHLORIDE 25 MG: 25 TABLET, FILM COATED ORAL at 16:15

## 2025-03-08 RX ADMIN — HEPARIN SODIUM 7500 UNITS: 5000 INJECTION, SOLUTION INTRAVENOUS; SUBCUTANEOUS at 14:53

## 2025-03-08 RX ADMIN — HYDROCORTISONE 1 APPLICATION: 25 LOTION TOPICAL at 09:01

## 2025-03-08 RX ADMIN — PYRIDOXINE HCL TAB 50 MG 300 MG: 50 TAB at 09:00

## 2025-03-08 RX ADMIN — LISINOPRIL 10 MG: 10 TABLET ORAL at 21:26

## 2025-03-08 RX ADMIN — OXYCODONE HYDROCHLORIDE AND ACETAMINOPHEN 500 MG: 500 TABLET ORAL at 08:57

## 2025-03-08 RX ADMIN — ATORVASTATIN CALCIUM 80 MG: 80 TABLET, FILM COATED ORAL at 01:27

## 2025-03-08 RX ADMIN — BACLOFEN 5 MG: 10 TABLET ORAL at 14:54

## 2025-03-08 RX ADMIN — CARVEDILOL 12.5 MG: 12.5 TABLET, FILM COATED ORAL at 08:59

## 2025-03-08 RX ADMIN — BACLOFEN 5 MG: 10 TABLET ORAL at 21:17

## 2025-03-08 RX ADMIN — HEPARIN SODIUM 7500 UNITS: 5000 INJECTION, SOLUTION INTRAVENOUS; SUBCUTANEOUS at 21:17

## 2025-03-08 RX ADMIN — EZETIMIBE 10 MG: 10 TABLET ORAL at 07:31

## 2025-03-08 RX ADMIN — FOLIC ACID TAB 400 MCG 800 MCG: 400 TAB at 08:58

## 2025-03-08 SDOH — SOCIAL STABILITY: SOCIAL INSECURITY: WITHIN THE LAST YEAR, HAVE YOU BEEN AFRAID OF YOUR PARTNER OR EX-PARTNER?: NO

## 2025-03-08 SDOH — SOCIAL STABILITY: SOCIAL INSECURITY
WITHIN THE LAST YEAR, HAVE YOU BEEN KICKED, HIT, SLAPPED, OR OTHERWISE PHYSICALLY HURT BY YOUR PARTNER OR EX-PARTNER?: NO

## 2025-03-08 SDOH — HEALTH STABILITY: MENTAL HEALTH: HOW OFTEN DO YOU HAVE SIX OR MORE DRINKS ON ONE OCCASION?: NEVER

## 2025-03-08 SDOH — SOCIAL STABILITY: SOCIAL NETWORK
DO YOU BELONG TO ANY CLUBS OR ORGANIZATIONS SUCH AS CHURCH GROUPS, UNIONS, FRATERNAL OR ATHLETIC GROUPS, OR SCHOOL GROUPS?: NO

## 2025-03-08 SDOH — SOCIAL STABILITY: SOCIAL INSECURITY: WITHIN THE LAST YEAR, HAVE YOU BEEN HUMILIATED OR EMOTIONALLY ABUSED IN OTHER WAYS BY YOUR PARTNER OR EX-PARTNER?: NO

## 2025-03-08 SDOH — SOCIAL STABILITY: SOCIAL NETWORK
IN A TYPICAL WEEK, HOW MANY TIMES DO YOU TALK ON THE PHONE WITH FAMILY, FRIENDS, OR NEIGHBORS?: MORE THAN THREE TIMES A WEEK

## 2025-03-08 SDOH — SOCIAL STABILITY: SOCIAL INSECURITY: DO YOU FEEL ANYONE HAS EXPLOITED OR TAKEN ADVANTAGE OF YOU FINANCIALLY OR OF YOUR PERSONAL PROPERTY?: NO

## 2025-03-08 SDOH — HEALTH STABILITY: MENTAL HEALTH
DO YOU FEEL STRESS - TENSE, RESTLESS, NERVOUS, OR ANXIOUS, OR UNABLE TO SLEEP AT NIGHT BECAUSE YOUR MIND IS TROUBLED ALL THE TIME - THESE DAYS?: NOT AT ALL

## 2025-03-08 SDOH — SOCIAL STABILITY: SOCIAL NETWORK: HOW OFTEN DO YOU GET TOGETHER WITH FRIENDS OR RELATIVES?: NEVER

## 2025-03-08 SDOH — SOCIAL STABILITY: SOCIAL INSECURITY: ABUSE: ADULT

## 2025-03-08 SDOH — SOCIAL STABILITY: SOCIAL NETWORK: HOW OFTEN DO YOU ATTEND CHURCH OR RELIGIOUS SERVICES?: MORE THAN 4 TIMES PER YEAR

## 2025-03-08 SDOH — SOCIAL STABILITY: SOCIAL INSECURITY: ARE YOU MARRIED, WIDOWED, DIVORCED, SEPARATED, NEVER MARRIED, OR LIVING WITH A PARTNER?: MARRIED

## 2025-03-08 SDOH — SOCIAL STABILITY: SOCIAL INSECURITY: ARE YOU OR HAVE YOU BEEN THREATENED OR ABUSED PHYSICALLY, EMOTIONALLY, OR SEXUALLY BY ANYONE?: NO

## 2025-03-08 SDOH — ECONOMIC STABILITY: TRANSPORTATION INSECURITY: IN THE PAST 12 MONTHS, HAS LACK OF TRANSPORTATION KEPT YOU FROM MEDICAL APPOINTMENTS OR FROM GETTING MEDICATIONS?: NO

## 2025-03-08 SDOH — ECONOMIC STABILITY: FOOD INSECURITY: HOW HARD IS IT FOR YOU TO PAY FOR THE VERY BASICS LIKE FOOD, HOUSING, MEDICAL CARE, AND HEATING?: VERY HARD

## 2025-03-08 SDOH — ECONOMIC STABILITY: FOOD INSECURITY: WITHIN THE PAST 12 MONTHS, YOU WORRIED THAT YOUR FOOD WOULD RUN OUT BEFORE YOU GOT THE MONEY TO BUY MORE.: NEVER TRUE

## 2025-03-08 SDOH — SOCIAL STABILITY: SOCIAL INSECURITY
WITHIN THE LAST YEAR, HAVE YOU BEEN RAPED OR FORCED TO HAVE ANY KIND OF SEXUAL ACTIVITY BY YOUR PARTNER OR EX-PARTNER?: NO

## 2025-03-08 SDOH — ECONOMIC STABILITY: HOUSING INSECURITY: IN THE LAST 12 MONTHS, WAS THERE A TIME WHEN YOU WERE NOT ABLE TO PAY THE MORTGAGE OR RENT ON TIME?: NO

## 2025-03-08 SDOH — HEALTH STABILITY: PHYSICAL HEALTH: ON AVERAGE, HOW MANY MINUTES DO YOU ENGAGE IN EXERCISE AT THIS LEVEL?: 0 MIN

## 2025-03-08 SDOH — ECONOMIC STABILITY: HOUSING INSECURITY: IN THE PAST 12 MONTHS, HOW MANY TIMES HAVE YOU MOVED WHERE YOU WERE LIVING?: 0

## 2025-03-08 SDOH — ECONOMIC STABILITY: FOOD INSECURITY: WITHIN THE PAST 12 MONTHS, THE FOOD YOU BOUGHT JUST DIDN'T LAST AND YOU DIDN'T HAVE MONEY TO GET MORE.: NEVER TRUE

## 2025-03-08 SDOH — HEALTH STABILITY: MENTAL HEALTH: HOW MANY DRINKS CONTAINING ALCOHOL DO YOU HAVE ON A TYPICAL DAY WHEN YOU ARE DRINKING?: 1 OR 2

## 2025-03-08 SDOH — HEALTH STABILITY: MENTAL HEALTH: HOW OFTEN DO YOU HAVE A DRINK CONTAINING ALCOHOL?: MONTHLY OR LESS

## 2025-03-08 SDOH — ECONOMIC STABILITY: HOUSING INSECURITY: AT ANY TIME IN THE PAST 12 MONTHS, WERE YOU HOMELESS OR LIVING IN A SHELTER (INCLUDING NOW)?: NO

## 2025-03-08 SDOH — SOCIAL STABILITY: SOCIAL INSECURITY: WERE YOU ABLE TO COMPLETE ALL THE BEHAVIORAL HEALTH SCREENINGS?: YES

## 2025-03-08 SDOH — SOCIAL STABILITY: SOCIAL NETWORK: HOW OFTEN DO YOU ATTEND MEETINGS OF THE CLUBS OR ORGANIZATIONS YOU BELONG TO?: NEVER

## 2025-03-08 SDOH — ECONOMIC STABILITY: INCOME INSECURITY: IN THE PAST 12 MONTHS HAS THE ELECTRIC, GAS, OIL, OR WATER COMPANY THREATENED TO SHUT OFF SERVICES IN YOUR HOME?: NO

## 2025-03-08 SDOH — SOCIAL STABILITY: SOCIAL NETWORK
DO YOU BELONG TO ANY CLUBS OR ORGANIZATIONS SUCH AS CHURCH GROUPS, UNIONS, FRATERNAL OR ATHLETIC GROUPS, OR SCHOOL GROUPS?: PATIENT DECLINED

## 2025-03-08 SDOH — SOCIAL STABILITY: SOCIAL INSECURITY: HAS ANYONE EVER THREATENED TO HURT YOUR FAMILY OR YOUR PETS?: NO

## 2025-03-08 SDOH — SOCIAL STABILITY: SOCIAL NETWORK: IN A TYPICAL WEEK, HOW MANY TIMES DO YOU TALK ON THE PHONE WITH FAMILY, FRIENDS, OR NEIGHBORS?: NEVER

## 2025-03-08 SDOH — SOCIAL STABILITY: SOCIAL INSECURITY: DOES ANYONE TRY TO KEEP YOU FROM HAVING/CONTACTING OTHER FRIENDS OR DOING THINGS OUTSIDE YOUR HOME?: NO

## 2025-03-08 SDOH — HEALTH STABILITY: PHYSICAL HEALTH
HOW OFTEN DO YOU NEED TO HAVE SOMEONE HELP YOU WHEN YOU READ INSTRUCTIONS, PAMPHLETS, OR OTHER WRITTEN MATERIAL FROM YOUR DOCTOR OR PHARMACY?: NEVER

## 2025-03-08 SDOH — SOCIAL STABILITY: SOCIAL NETWORK: HOW OFTEN DO YOU GET TOGETHER WITH FRIENDS OR RELATIVES?: MORE THAN THREE TIMES A WEEK

## 2025-03-08 SDOH — HEALTH STABILITY: PHYSICAL HEALTH: ON AVERAGE, HOW MANY DAYS PER WEEK DO YOU ENGAGE IN MODERATE TO STRENUOUS EXERCISE (LIKE A BRISK WALK)?: 0 DAYS

## 2025-03-08 SDOH — SOCIAL STABILITY: SOCIAL INSECURITY: HAVE YOU HAD THOUGHTS OF HARMING ANYONE ELSE?: NO

## 2025-03-08 SDOH — SOCIAL STABILITY: SOCIAL INSECURITY: DO YOU FEEL UNSAFE GOING BACK TO THE PLACE WHERE YOU ARE LIVING?: YES

## 2025-03-08 SDOH — ECONOMIC STABILITY: FOOD INSECURITY: HOW HARD IS IT FOR YOU TO PAY FOR THE VERY BASICS LIKE FOOD, HOUSING, MEDICAL CARE, AND HEATING?: SOMEWHAT HARD

## 2025-03-08 SDOH — SOCIAL STABILITY: SOCIAL INSECURITY: ARE THERE ANY APPARENT SIGNS OF INJURIES/BEHAVIORS THAT COULD BE RELATED TO ABUSE/NEGLECT?: NO

## 2025-03-08 SDOH — SOCIAL STABILITY: SOCIAL NETWORK: HOW OFTEN DO YOU ATTEND MEETINGS OF THE CLUBS OR ORGANIZATIONS YOU BELONG TO?: 1 TO 4 TIMES PER YEAR

## 2025-03-08 SDOH — SOCIAL STABILITY: SOCIAL INSECURITY: HAVE YOU HAD ANY THOUGHTS OF HARMING ANYONE ELSE?: NO

## 2025-03-08 ASSESSMENT — ACTIVITIES OF DAILY LIVING (ADL)
WALKS IN HOME: DEPENDENT
JUDGMENT_ADEQUATE_SAFELY_COMPLETE_DAILY_ACTIVITIES: YES
LACK_OF_TRANSPORTATION: NO
BATHING: DEPENDENT
LACK_OF_TRANSPORTATION: NO
ADL_ASSISTANCE: INDEPENDENT
LACK_OF_TRANSPORTATION: NO
ADL_ASSISTANCE: INDEPENDENT
BATHING_ASSISTANCE: NOT PERFORMED
GROOMING: NEEDS ASSISTANCE
ADEQUATE_TO_COMPLETE_ADL: YES
HEARING - RIGHT EAR: FUNCTIONAL
PATIENT'S MEMORY ADEQUATE TO SAFELY COMPLETE DAILY ACTIVITIES?: NO
LACK_OF_TRANSPORTATION: NO
ASSISTIVE_DEVICE: WHEELCHAIR
HEARING - LEFT EAR: FUNCTIONAL
FEEDING YOURSELF: NEEDS ASSISTANCE
TOILETING: DEPENDENT
DRESSING YOURSELF: DEPENDENT

## 2025-03-08 ASSESSMENT — COGNITIVE AND FUNCTIONAL STATUS - GENERAL
PERSONAL GROOMING: A LITTLE
DRESSING REGULAR UPPER BODY CLOTHING: A LITTLE
CLIMB 3 TO 5 STEPS WITH RAILING: TOTAL
WALKING IN HOSPITAL ROOM: TOTAL
DRESSING REGULAR UPPER BODY CLOTHING: A LITTLE
CLIMB 3 TO 5 STEPS WITH RAILING: TOTAL
MOVING FROM LYING ON BACK TO SITTING ON SIDE OF FLAT BED WITH BEDRAILS: A LITTLE
CLIMB 3 TO 5 STEPS WITH RAILING: TOTAL
DRESSING REGULAR LOWER BODY CLOTHING: A LOT
TURNING FROM BACK TO SIDE WHILE IN FLAT BAD: A LITTLE
TOILETING: A LITTLE
MOVING TO AND FROM BED TO CHAIR: A LOT
MOBILITY SCORE: 13
TOILETING: A LOT
TOILETING: A LITTLE
MOVING TO AND FROM BED TO CHAIR: A LOT
MOBILITY SCORE: 12
MOVING TO AND FROM BED TO CHAIR: A LOT
STANDING UP FROM CHAIR USING ARMS: A LOT
DRESSING REGULAR UPPER BODY CLOTHING: A LITTLE
DAILY ACTIVITIY SCORE: 15
DRESSING REGULAR LOWER BODY CLOTHING: A LOT
HELP NEEDED FOR BATHING: A LOT
EATING MEALS: A LITTLE
MOVING FROM LYING ON BACK TO SITTING ON SIDE OF FLAT BED WITH BEDRAILS: A LITTLE
WALKING IN HOSPITAL ROOM: A LOT
TURNING FROM BACK TO SIDE WHILE IN FLAT BAD: A LITTLE
HELP NEEDED FOR BATHING: A LITTLE
DAILY ACTIVITIY SCORE: 19
DAILY ACTIVITIY SCORE: 19
MOVING FROM LYING ON BACK TO SITTING ON SIDE OF FLAT BED WITH BEDRAILS: A LITTLE
PATIENT BASELINE BEDBOUND: YES
WALKING IN HOSPITAL ROOM: A LOT
MOBILITY SCORE: 12
TURNING FROM BACK TO SIDE WHILE IN FLAT BAD: A LOT
HELP NEEDED FOR BATHING: A LITTLE
STANDING UP FROM CHAIR USING ARMS: A LOT
STANDING UP FROM CHAIR USING ARMS: A LOT
DRESSING REGULAR LOWER BODY CLOTHING: A LOT

## 2025-03-08 ASSESSMENT — PAIN - FUNCTIONAL ASSESSMENT
PAIN_FUNCTIONAL_ASSESSMENT: 0-10

## 2025-03-08 ASSESSMENT — LIFESTYLE VARIABLES
SKIP TO QUESTIONS 9-10: 1
PRESCIPTION_ABUSE_PAST_12_MONTHS: NO
HOW MANY STANDARD DRINKS CONTAINING ALCOHOL DO YOU HAVE ON A TYPICAL DAY: 1 OR 2
AUDIT-C TOTAL SCORE: 1
HOW OFTEN DO YOU HAVE 6 OR MORE DRINKS ON ONE OCCASION: NEVER
AUDIT-C TOTAL SCORE: 1
SKIP TO QUESTIONS 9-10: 1
SUBSTANCE_ABUSE_PAST_12_MONTHS: NO
HOW OFTEN DO YOU HAVE A DRINK CONTAINING ALCOHOL: MONTHLY OR LESS
AUDIT-C TOTAL SCORE: 1

## 2025-03-08 ASSESSMENT — PAIN DESCRIPTION - ORIENTATION: ORIENTATION: MID

## 2025-03-08 ASSESSMENT — PAIN DESCRIPTION - DESCRIPTORS: DESCRIPTORS: HEADACHE

## 2025-03-08 ASSESSMENT — PAIN SCALES - GENERAL
PAINLEVEL_OUTOF10: 5 - MODERATE PAIN
PAINLEVEL_OUTOF10: 0 - NO PAIN
PAINLEVEL_OUTOF10: 2
PAINLEVEL_OUTOF10: 0 - NO PAIN
PAINLEVEL_OUTOF10: 0 - NO PAIN

## 2025-03-08 ASSESSMENT — PATIENT HEALTH QUESTIONNAIRE - PHQ9
1. LITTLE INTEREST OR PLEASURE IN DOING THINGS: MORE THAN HALF THE DAYS
SUM OF ALL RESPONSES TO PHQ9 QUESTIONS 1 & 2: 4
2. FEELING DOWN, DEPRESSED OR HOPELESS: MORE THAN HALF THE DAYS

## 2025-03-08 ASSESSMENT — PAIN DESCRIPTION - LOCATION: LOCATION: HEAD

## 2025-03-08 NOTE — H&P
History Of Present Illness  Mamta Paredes is a 54 y.o. female with past medical history significant for stroke with left-sided deficit who was brought to the hospital with a complaint of fall via EMS.  The patient was recently discharged from skilled nursing facility on  and has been at home with home health care for 2 and half weeks.  Patient said that he fell 3 times in the last week.  Patient said that she is home alone during the daytime and she does not feel safe at home.  In the emergency room initial workup was done and the patient has been admitted to the floor for further management.  Patient did complain of head injury but no loss of consciousness.  Patient said that she had a fracture of left second toe and has a surgical boot.  Patient denies any nausea, vomiting, diarrhea, dysuria [facility] of cancer.  No hematemesis, hematochezia or melena.     Past Medical History  Past Medical History:   Diagnosis Date    Stroke (Multi)    Allergic purpura, asthma, carotid artery stenosis, chronic kidney disease, coronary artery disease, depression, fracture of metatarsal bone of the left foot, systolic congestive heart failure, intracranial vascular stenosis, hypertension, obesity, hyperlipidemia, history of COPD syndrome, proteinuria, stroke in the right and CVA with left weakness, and dysarthria-resolved    Surgical History  Past Surgical History:   Procedure Laterality Date    ABDOMINAL SURGERY      BRAIN SURGERY      CHOLECYSTECTOMY      COSMETIC SURGERY      FRACTURE SURGERY     , EGD, left STA-MCA bypass  Social History  She reports that she has never smoked. She has never used smokeless tobacco. She reports current alcohol use. She reports that she does not use drugs.    Family History  Mother had hypertension and diabetes.  Father had hypertension.  Maternal grandfather had ischemic heart disease     Allergies  Nitrous oxide and Xanax [alprazolam]    Review of Systems  I reviewed all  "systems reviewed as above otherwise is negative.    General examination: The patient morbidly obese, lying in the bed, comfortable, did not look in acute distress.    Physical Exam  HEENT:  Head externally atraumatic, no pallor, no icterus, extraocular movements intact, pupils reactive to light, oral mucosa moist and throat clear.  Neck:  Supple, no JVP, no palpable adenopathy or thyromegaly.  No carotid bruit.  Chest:  Clear to auscultation and resonant.  Heart:  Regular rate and rhythm, no murmur or gallop could be appreciated.  Abdomen:  Soft, nontender, morbidly obese, bowel sounds present, normoactive, no palpable hepatosplenomegaly.  Extremities:  No edema, pulses present, no cyanosis or clubbing.  CNS:  Patient alert, oriented to time, place and person.  Power 5/5 all over and deep tendon reflexes symmetrical, cranial nerves 2-12 grossly intact.  Skin:  No active rash.  Musculoskeletal:  No joint swelling or erythema, range of movement normal.  Last Recorded Vitals  Heart Rate:  [70-87]   Temp:  [36.5 °C (97.7 °F)-36.7 °C (98.1 °F)]   Resp:  [15-20]   BP: (107-138)/(51-82)   Height:  [160 cm (5' 3\")]   Weight:  [122 kg (268 lb)]   SpO2:  [94 %-96 %]       Relevant Results        Results for orders placed or performed during the hospital encounter of 03/07/25 (from the past 24 hours)   CBC and Auto Differential   Result Value Ref Range    WBC 9.8 4.4 - 11.3 x10*3/uL    nRBC 0.0 0.0 - 0.0 /100 WBCs    RBC 4.31 4.00 - 5.20 x10*6/uL    Hemoglobin 13.1 12.0 - 16.0 g/dL    Hematocrit 39.4 36.0 - 46.0 %    MCV 91 80 - 100 fL    MCH 30.4 26.0 - 34.0 pg    MCHC 33.2 32.0 - 36.0 g/dL    RDW 15.8 (H) 11.5 - 14.5 %    Platelets 309 150 - 450 x10*3/uL    Neutrophils % 61.1 40.0 - 80.0 %    Immature Granulocytes %, Automated 0.2 0.0 - 0.9 %    Lymphocytes % 27.7 13.0 - 44.0 %    Monocytes % 8.5 2.0 - 10.0 %    Eosinophils % 2.0 0.0 - 6.0 %    Basophils % 0.5 0.0 - 2.0 %    Neutrophils Absolute 5.98 1.20 - 7.70 x10*3/uL    " Immature Granulocytes Absolute, Automated 0.02 0.00 - 0.70 x10*3/uL    Lymphocytes Absolute 2.71 1.20 - 4.80 x10*3/uL    Monocytes Absolute 0.83 0.10 - 1.00 x10*3/uL    Eosinophils Absolute 0.20 0.00 - 0.70 x10*3/uL    Basophils Absolute 0.05 0.00 - 0.10 x10*3/uL   Comprehensive metabolic panel   Result Value Ref Range    Glucose 88 74 - 99 mg/dL    Sodium 138 136 - 145 mmol/L    Potassium 4.2 3.5 - 5.3 mmol/L    Chloride 104 98 - 107 mmol/L    Bicarbonate 25 21 - 32 mmol/L    Anion Gap 13 10 - 20 mmol/L    Urea Nitrogen 12 6 - 23 mg/dL    Creatinine 0.88 0.50 - 1.05 mg/dL    eGFR 79 >60 mL/min/1.73m*2    Calcium 9.1 8.6 - 10.3 mg/dL    Albumin 3.9 3.4 - 5.0 g/dL    Alkaline Phosphatase 82 33 - 110 U/L    Total Protein 7.0 6.4 - 8.2 g/dL    AST 15 9 - 39 U/L    Bilirubin, Total 0.4 0.0 - 1.2 mg/dL    ALT 26 7 - 45 U/L   Magnesium   Result Value Ref Range    Magnesium 2.01 1.60 - 2.40 mg/dL   Urinalysis with Reflex Culture and Microscopic   Result Value Ref Range    Color, Urine Light-Yellow Light-Yellow, Yellow, Dark-Yellow    Appearance, Urine Clear Clear    Specific Gravity, Urine 1.006 1.005 - 1.035    pH, Urine 6.0 5.0, 5.5, 6.0, 6.5, 7.0, 7.5, 8.0    Protein, Urine NEGATIVE NEGATIVE, 10 (TRACE), 20 (TRACE) mg/dL    Glucose, Urine Normal Normal mg/dL    Blood, Urine NEGATIVE NEGATIVE mg/dL    Ketones, Urine NEGATIVE NEGATIVE mg/dL    Bilirubin, Urine NEGATIVE NEGATIVE mg/dL    Urobilinogen, Urine Normal Normal mg/dL    Nitrite, Urine NEGATIVE NEGATIVE    Leukocyte Esterase, Urine NEGATIVE NEGATIVE   Extra Urine Gray Tube   Result Value Ref Range    Extra Tube Hold for add-ons.    CBC   Result Value Ref Range    WBC 8.0 4.4 - 11.3 x10*3/uL    nRBC 0.0 0.0 - 0.0 /100 WBCs    RBC 4.03 4.00 - 5.20 x10*6/uL    Hemoglobin 12.1 12.0 - 16.0 g/dL    Hematocrit 35.8 (L) 36.0 - 46.0 %    MCV 89 80 - 100 fL    MCH 30.0 26.0 - 34.0 pg    MCHC 33.8 32.0 - 36.0 g/dL    RDW 15.6 (H) 11.5 - 14.5 %    Platelets 277 150 - 450  x10*3/uL   Comprehensive metabolic panel   Result Value Ref Range    Glucose 103 (H) 74 - 99 mg/dL    Sodium 141 136 - 145 mmol/L    Potassium 4.0 3.5 - 5.3 mmol/L    Chloride 106 98 - 107 mmol/L    Bicarbonate 27 21 - 32 mmol/L    Anion Gap 12 10 - 20 mmol/L    Urea Nitrogen 11 6 - 23 mg/dL    Creatinine 0.84 0.50 - 1.05 mg/dL    eGFR 83 >60 mL/min/1.73m*2    Calcium 8.6 8.6 - 10.3 mg/dL    Albumin 3.5 3.4 - 5.0 g/dL    Alkaline Phosphatase 75 33 - 110 U/L    Total Protein 5.9 (L) 6.4 - 8.2 g/dL    AST 15 9 - 39 U/L    Bilirubin, Total 0.6 0.0 - 1.2 mg/dL    ALT 24 7 - 45 U/L   POCT GLUCOSE   Result Value Ref Range    POCT Glucose 96 74 - 99 mg/dL   POCT GLUCOSE   Result Value Ref Range    POCT Glucose 112 (H) 74 - 99 mg/dL   Urinalysis with Reflex Microscopic   Result Value Ref Range    Color, Urine Light-Yellow Light-Yellow, Yellow, Dark-Yellow    Appearance, Urine Clear Clear    Specific Gravity, Urine 1.013 1.005 - 1.035    pH, Urine 6.0 5.0, 5.5, 6.0, 6.5, 7.0, 7.5, 8.0    Protein, Urine NEGATIVE NEGATIVE, 10 (TRACE), 20 (TRACE) mg/dL    Glucose, Urine Normal Normal mg/dL    Blood, Urine NEGATIVE NEGATIVE mg/dL    Ketones, Urine NEGATIVE NEGATIVE mg/dL    Bilirubin, Urine NEGATIVE NEGATIVE mg/dL    Urobilinogen, Urine Normal Normal mg/dL    Nitrite, Urine NEGATIVE NEGATIVE    Leukocyte Esterase, Urine NEGATIVE NEGATIVE     Prior to Admission medications    Medication Sig Start Date End Date Taking? Authorizing Provider   acetaminophen (Tylenol) 325 mg tablet Take 2 tablets (650 mg) by mouth every 6 hours if needed. 3/4/24  Yes Historical Provider, MD   aspirin 325 mg tablet Take 1 tablet (325 mg) by mouth once daily.   Yes Historical Provider, MD   atorvastatin (Lipitor) 80 mg tablet Take 1 tablet (80 mg) by mouth once daily. 6/7/24  Yes Historical Provider, MD   baclofen (Lioresal) 5 mg tablet Take 1 tablet (5 mg) by mouth 3 times a day. 3/4/24 9/3/25 Yes Historical Provider, MD   betaine 1 gram/scoop powder  Take by mouth twice a day. Take 5 scoops daily. Take 3 scoops in the morning and 2 scoops in the evening 6/7/24  Yes Historical Provider, MD   busPIRone (Buspar) 10 mg tablet Take 1 tablet (10 mg) by mouth twice a day. 3/4/24  Yes Historical Provider, MD   calcium carbonate (Tums) 200 mg calcium chewable tablet Chew 1-2 tablets (500-1,000 mg) once daily as needed for heartburn. 6/7/24  Yes Historical Provider, MD   carvedilol (Coreg) 12.5 mg tablet Take 1 tablet (12.5 mg) by mouth twice a day. Take with meals 6/7/24  Yes Historical Provider, MD   cholecalciferol (Vitamin D-3) 25 MCG (1000 UT) capsule Take 1 capsule (25 mcg) by mouth once daily. 6/11/24  Yes Historical Provider, MD   DULoxetine (Cymbalta) 60 mg DR capsule Take 1 capsule (60 mg) by mouth once daily. 2/14/24  Yes Historical Provider, MD   ezetimibe (Zetia) 10 mg tablet Take 1 tablet (10 mg) by mouth once daily. 8/10/23  Yes Historical Provider, MD   folic acid (Folvite) 800 mcg tablet Take 1 tablet (800 mcg) by mouth twice a day. 3/4/24  Yes Historical Provider, MD   hydrocortisone 2.5 % lotion Apply 1 Application topically 2 times a day. 2/19/25  Yes Historical Provider, MD   lamoTRIgine (LaMICtal) 100 mg tablet Take 1 tablet (100 mg) by mouth once daily. 2/14/24  Yes Historical Provider, MD   lisinopril 10 mg tablet Take 1 tablet (10 mg) by mouth once daily at bedtime. 2/19/25 3/21/25 Yes Historical Provider, MD   melatonin 3 mg tablet Take 2 tablets (6 mg) by mouth once daily at bedtime. 3/4/24  Yes Historical Provider, MD   omega-3/dha/epa/fish oil (OMEGA-3 FISH OIL ORAL) Take 2,000 mg by mouth once daily.   Yes Historical Provider, MD   pyridoxine (Vitamin B-6) 100 mg tablet Take 5 tablets (500 mg) by mouth 2 times a day. Take 3 tablets every morning and 2 tablets every evening   Yes Historical Provider, MD   spironolactone (Aldactone) 25 mg tablet Take 0.5 tablets (12.5 mg) by mouth once daily.   Yes Historical Provider, MD   albuterol 90  mcg/actuation inhaler Inhale 1-2 puffs every 4 hours if needed for wheezing or shortness of breath.  Patient not taking: Reported on 3/7/2025    Historical Provider, MD   ascorbic acid (Vitamin C) 500 mg tablet Take 1 tablet (500 mg) by mouth once daily.  Patient not taking: Reported on 3/7/2025 6/8/24   Historical Provider, MD   fluticasone (Flonase) 50 mcg/actuation nasal spray Administer 1 spray into each nostril every 12 hours if needed.  Patient not taking: Reported on 3/7/2025 3/4/24   Historical Provider, MD       Current Facility-Administered Medications:     acetaminophen (Tylenol) tablet 650 mg, 650 mg, oral, q4h PRN, 650 mg at 03/08/25 1027 **OR** acetaminophen (Tylenol) oral liquid 650 mg, 650 mg, oral, q4h PRN **OR** acetaminophen (Tylenol) suppository 650 mg, 650 mg, rectal, q4h PRN, Madhu Ling MD    albuterol 2.5 mg /3 mL (0.083 %) nebulizer solution 3 mL, 3 mL, nebulization, q4h PRN, Madhu Ling MD    ascorbic acid (Vitamin C) tablet 500 mg, 500 mg, oral, Daily, Madhu Ling MD, 500 mg at 03/08/25 0857    aspirin tablet 325 mg, 325 mg, oral, Daily, Madhu Ling MD, 325 mg at 03/08/25 0857    atorvastatin (Lipitor) tablet 80 mg, 80 mg, oral, Nightly, Madhu Ling MD, 80 mg at 03/08/25 0127    baclofen (Lioresal) tablet 5 mg, 5 mg, oral, TID, Madhu Ling MD, 5 mg at 03/08/25 0731    benzocaine-menthol (Cepastat Sore Throat) lozenge 1 lozenge, 1 lozenge, Mouth/Throat, q2h PRN, Madhu Ling MD    busPIRone (Buspar) tablet 10 mg, 10 mg, oral, BID, Madhu Ling MD, 10 mg at 03/08/25 0857    calcium carbonate (Tums) chewable tablet 1,000 mg, 1,000 mg, oral, Daily PRN, Madhu Ling MD    carvedilol (Coreg) tablet 12.5 mg, 12.5 mg, oral, BID, Madhu Ling MD, 12.5 mg at 03/08/25 0859    cholecalciferol (Vitamin D-3) tablet 1,000 Units, 1,000 Units, oral, Daily, Madhu Ling MD, 1,000 Units at 03/08/25 0731     dextromethorphan-guaifenesin (Robitussin DM)  mg/5 mL oral liquid 5 mL, 5 mL, oral, q4h PRN, Madhu Ling MD    DULoxetine (Cymbalta) DR capsule 60 mg, 60 mg, oral, Nightly, Madhu Ling MD, 60 mg at 03/08/25 0117    ezetimibe (Zetia) tablet 10 mg, 10 mg, oral, Daily, Madhu Ling MD, 10 mg at 03/08/25 0731    fluticasone (Flonase) nasal spray 1 spray, 1 spray, Each Nostril, q12h PRN, Madhu Ling MD    folic acid (Folvite) tablet 800 mcg, 800 mcg, oral, Daily, Madhu Ling MD, 800 mcg at 03/08/25 0858    guaiFENesin (Mucinex) 12 hr tablet 600 mg, 600 mg, oral, q12h PRN, Madhu Ling MD    heparin (porcine) injection 7,500 Units, 7,500 Units, subcutaneous, q8h OLIVIA, Madhu Ling MD    hydrocortisone 2.5 % lotion 1 Application, 1 Application, Topical, BID, Madhu Ling MD, 1 Application at 03/08/25 0901    lamoTRIgine (LaMICtal) tablet 100 mg, 100 mg, oral, Nightly, Madhu Ling MD, 100 mg at 03/08/25 0126    lisinopril tablet 10 mg, 10 mg, oral, Nightly, Madhu Ling MD    melatonin tablet 6 mg, 6 mg, oral, Nightly, Madhu Ling MD    polyethylene glycol (Glycolax, Miralax) packet 17 g, 17 g, oral, Daily PRN, Madhu Ling MD    pyridoxine (Vitamin B-6) tablet 200 mg, 200 mg, oral, Nightly, Madhu Ling MD    pyridoxine (Vitamin B-6) tablet 300 mg, 300 mg, oral, Daily, Madhu Ling MD, 300 mg at 03/08/25 0900    spironolactone (Aldactone) tablet 12.5 mg, 12.5 mg, oral, Daily, Madhu Ling MD, 12.5 mg at 03/08/25 0857  XR chest 1 view    Result Date: 3/7/2025  STUDY: Chest Radiograph;  3/7/2025 5:29 PM INDICATION: Weakness, fall. COMPARISON: None Available. ACCESSION NUMBER(S): [id number] ORDERING CLINICIAN: CJ MARINO TECHNIQUE:  Frontal chest was obtained at 17:28 hours. FINDINGS: CARDIOMEDIASTINAL SILHOUETTE: Cardiomediastinal silhouette is borderline in size and configuration. There is elevation  of right hemidiaphragm.  LUNGS: Lungs are clear.  ABDOMEN: No remarkable upper abdominal findings.  BONES: No acute osseous changes.    Elevation right hemidiaphragm. Signed by Mike Murrell, DO    CT head wo IV contrast    Result Date: 3/7/2025  Interpreted By:  Oneil Matos, STUDY: CT HEAD WO IV CONTRAST;  3/7/2025 6:16 pm   INDICATION: Signs/Symptoms:fall, hit head.   COMPARISON: Head CT 01/15/2024   ACCESSION NUMBER(S): [id number]   ORDERING CLINICIAN: CJ MARINO   TECHNIQUE: Noncontrast axial CT scan of head was performed. Angled reformats in brain and bone windows were generated. The images were reviewed in bone, brain, blood and soft tissue windows.   FINDINGS: CSF Spaces: Ventricles appear stable respect to size and configuration. There is no extraaxial fluid collection.   Parenchyma: Parenchyma appears overall unchanged with right greater than left bifrontal encephalomalacia as well as background of parenchymal atrophy. The grey-white differentiation is intact. There is no mass effect or midline shift.  There is no intracranial hemorrhage.   Calvarium: Stable postsurgical changes from bilateral craniotomy. No obvious acute displaced calvarial fracture. Incidentally noted hyperostosis frontalis interna.   Paranasal sinuses and mastoids: Mild mucosal thickening of the right maxillary and bilateral ethmoid sinuses. Small mucous tension cyst or polyp of the left sphenoid sinus. Mastoid air cells appear clear.       No CT evidence of acute intracranial injury.       MACRO: None     Signed by: Oneil Matos 3/7/2025 6:21 PM Dictation workstation:   [id number]    XR pelvis 1-2 views    Result Date: 3/7/2025  STUDY: Pelvis Radiographs; 3/7/2025 5:29 PM INDICATION: Weakness, fall. COMPARISON: None Available. ACCESSION NUMBER(S): [id number] ORDERING CLINICIAN: CJ MARINO TECHNIQUE:  One view(s) of the pelvis. FINDINGS:  The pelvic ring is intact.  The osseous structures are intact with no  evidence for acute fracture or focal destruction.  Femoral heads are well-seated in the acetabula with no subluxation or dislocation.    No evidence for acute fracture or dislocation. Signed by Raj Yanez MD    XR ANKLE 3V AP/LAT/OBL RT    Result Date: 3/4/2025  * * *Final Report* * * DATE OF EXAM: Mar  4 2025 10:16AM   EOX   [id number]  -  XR ANKLE 3V AP/LAT/OBL RT  / ACCESSION #  [id number] PROCEDURE REASON: CLOSED FRACTURE RIGHT ANKLE      * * * * Physician Interpretation * * * *  HISTORY (as given from clinical provider):  CLOSED FRACTURE RIGHT ANKLE. Additional history provided by the performing technologist (if any):   --> *PT STS 1 YEAR FOLLOW UP FOR RIGHT ANKLE INJURY / FRACTURE. TECHNIQUE: XR ANKLE 3V AP/LAT/OBL RT COMPARISON: 09/24/2024 RESULT: Plate and screw fixation of the distal fibula with one syndesmotic tightrope device.  Expected postsurgical appearance.  There is no fracture line seen.  Ankle joint space and alignment is normal. Plantar calcaneal spur. No other significant abnormality. _________________ IMPRESSION: NO SIGNIFICANT CHANGE.  [facility]. [facility]   Transcribe Date/Time: Mar  4 2025 10:35A Dictated by : NUBIA KIM MD This examination was interpreted and the report reviewed and electronically signed by: NUBIA KIM MD on Mar  4 2025 10:36AM  EST [id number]^SI^ACN    XR ankle right 3+ views    Result Date: 3/4/2025  * * *Final Report* * * DATE OF EXAM: Mar  4 2025 10:16AM   EOX   [id number]  -  XR ANKLE 3V AP/LAT/OBL RT  / ACCESSION #  [id number] PROCEDURE REASON: CLOSED FRACTURE RIGHT ANKLE      * * * * Physician Interpretation * * * *  HISTORY (as given from clinical provider):  CLOSED FRACTURE RIGHT ANKLE. Additional history provided by the performing technologist (if any):   --> *PT STS 1 YEAR FOLLOW UP FOR RIGHT ANKLE INJURY / FRACTURE. TECHNIQUE: XR ANKLE 3V AP/LAT/OBL RT COMPARISON: 09/24/2024 RESULT: Plate and screw fixation of the distal  fibula with one syndesmotic tightrope device.  Expected postsurgical appearance.  There is no fracture line seen.  Ankle joint space and alignment is normal. Plantar calcaneal spur. No other significant abnormality. _________________    IMPRESSION: NO SIGNIFICANT CHANGE.  EXPECTED SURGICAL APPEARANCE. : [facility]   Transcribe Date/Time: Mar  4 2025 10:35A Dictated by : NUBIA KIM MD This examination was interpreted and the report reviewed and electronically signed by: NUBIA KIM MD on Mar  4 2025 10:36AM  EST    No results found for the last 90 days.       Assessment/Plan   Assessment & Plan  Fall, initial encounter  Recurrent falls  Morbid obese  History of CVA with left hemiparesis  Fracture second left toe  Hypertension  Hyperlipidemia  Coronary artery disease  Depression  Chronic systolic congestive heart failure  Homocystinuria  Intracranial vascular stenosis  Polycystic ovarian syndrome    Plan: Continue current medication.  Supportive care.  Patient has recurrent falls.  This appears to be mechanical fall.  Patient says she was coming out of the bathroom and she had a surgical to and left-sided weakness due to previous stroke.  Patient said her foot got caught in the door and she fell down.  Patient had 3 falls in the last week.  Radiological workup including x-ray of the pelvis, chest, and CT of the brain are negative.  Give physical therapy and Occupational Therapy.  Blood work is also negative.  Possible discharge soon per PT/OT recommendations.                   Madhu Ling MD

## 2025-03-08 NOTE — PROGRESS NOTES
03/08/25 1733   Physical Activity   On average, how many days per week do you engage in moderate to strenuous exercise (like a brisk walk)? 0 days   On average, how many minutes do you engage in exercise at this level? 0 min   Financial Resource Strain   How hard is it for you to pay for the very basics like food, housing, medical care, and heating? Somewhat   Housing Stability   In the last 12 months, was there a time when you were not able to pay the mortgage or rent on time? N   In the past 12 months, how many times have you moved where you were living? 0   At any time in the past 12 months, were you homeless or living in a shelter (including now)? N   Transportation Needs   In the past 12 months, has lack of transportation kept you from medical appointments or from getting medications? no   In the past 12 months, has lack of transportation kept you from meetings, work, or from getting things needed for daily living? No   Food Insecurity   Within the past 12 months, you worried that your food would run out before you got the money to buy more. Never true   Within the past 12 months, the food you bought just didn't last and you didn't have money to get more. Never true   Stress   Do you feel stress - tense, restless, nervous, or anxious, or unable to sleep at night because your mind is troubled all the time - these days? Not at all   Social Connections   In a typical week, how many times do you talk on the phone with family, friends, or neighbors? More than 3   How often do you get together with friends or relatives? Never   How often do you attend Jain or Latter-day services? More than 4   Do you belong to any clubs or organizations such as Jain groups, unions, fraternal or athletic groups, or school groups? No   How often do you attend meetings of the clubs or organizations you belong to? Never   Are you , , , , never , or living with a partner?    Intimate  Partner Violence   Within the last year, have you been afraid of your partner or ex-partner? No   Within the last year, have you been humiliated or emotionally abused in other ways by your partner or ex-partner? No   Within the last year, have you been kicked, hit, slapped, or otherwise physically hurt by your partner or ex-partner? No   Within the last year, have you been raped or forced to have any kind of sexual activity by your partner or ex-partner? No   Alcohol Use   Q1: How often do you have a drink containing alcohol? Monthly or l   Q2: How many drinks containing alcohol do you have on a typical day when you are drinking? 1 or 2   Q3: How often do you have six or more drinks on one occasion? Never   Utilities   In the past 12 months has the electric, gas, oil, or water company threatened to shut off services in your home? No   Health Literacy   How often do you need to have someone help you when you read instructions, pamphlets, or other written material from your doctor or pharmacy? Never

## 2025-03-08 NOTE — PROGRESS NOTES
Occupational Therapy                 Therapy Communication Note    Patient Name: Mamta Paredes  MRN: [id number]  Department: [address]  Room: [address] [place], [place] [address]  Today's Date: 3/8/2025     Discipline: Occupational Therapy    OT Missed Visit: Yes     Missed Visit Reason: Missed Visit Reason: Other (Comment) (Attempted to see, Pt with severe back pain despite medication also with tearful/anxiety reporting ' I don't want to be a burden, I'm ready to punch my ticket, I wish I would have a heart attack and be done' Nursing informed. Re attempt as appropriate.)    Missed Time: Attempt    Comment:

## 2025-03-08 NOTE — PROGRESS NOTES
03/08/25 1739   [facility] Disability Status   Are you deaf or do you have serious difficulty hearing? N   Are you blind or do you have serious difficulty seeing, even when wearing glasses? N   Because of a physical, mental, or emotional condition, do you have serious difficulty concentrating, remembering, or making decisions? (5 years old or older) N   Do you have serious difficulty walking or climbing stairs? Y   Do you have serious difficulty dressing or bathing? Y   Because of a physical, mental, or emotional condition, do you have serious difficulty doing errands alone such as visiting the doctor? Y

## 2025-03-08 NOTE — PROGRESS NOTES
Occupational Therapy    Evaluation    Patient Name: Mamta Paredes  MRN: [id number]  Department: [address]  Room: [address] [place] [address]  Today's Date: 3/8/2025  Time Calculation  Start Time: 1500  Stop Time: 1518  Time Calculation (min): 18 min    Assessment  IP OT Assessment  OT Assessment: Pt demonstrates decreased problem solving, standing tolerance, standing balance and safety during functional tasks. OT to address ADLs, transfers, safety with functional tasks, d/c planning. Recommend d/c to Mod intensity  Prognosis: Good  Barriers to Discharge Home: Caregiver assistance  Caregiver Assistance: Caregiver assistance needed per identified barriers - however, level of patient's required assistance exceeds assistance available at home  Evaluation/Treatment Tolerance: Patient tolerated treatment well  Medical Staff Made Aware: Yes  End of Session Communication: Bedside nurse  End of Session Patient Position: Up in chair, Alarm off, not on at start of session (RN cleared pt to be without chair alarm)  Plan:  Treatment Interventions: ADL retraining, Functional transfer training, Endurance training, Patient/family training, Equipment evaluation/education, Neuromuscular reeducation, Compensatory technique education  OT Frequency: 3 times per week  OT Discharge Recommendations: Moderate intensity level of continued care  OT Recommended Transfer Status: Minimal assist, Assist of 1  OT - OK to Discharge: Yes    Subjective   Current Problem:  1. Fall, initial encounter        2. Closed head injury, initial encounter        3. Weakness        4. Anxiety  hydrOXYzine HCL (Atarax) 25 mg tablet        General:  General  Reason for Referral: 54yoF PMH CVA - L weakness, CKD, CAD, L metatarsal fx, HTN, systolic CHF, obesity, h/o COPD syndrome presenting with multiple falls at home.  Referred By: Sushil  Past Medical History Relevant to Rehab: CVA - L weakness, CKD, CAD, L metatarsal fx, HTN, systolic CHF, obesity, h/o COPD  syndrome  Family/Caregiver Present: No  Co-Treatment: PT  Co-Treatment Reason: To optimize pt safety and therapeutic outcomes  Prior to Session Communication: Bedside nurse  Patient Position Received: Bed, 3 rail up, Alarm off, not on at start of session  General Comment: Cleared and agreeable to participate in OT  Precautions:  LE Weight Bearing Status: Weight Bearing as Tolerated (LLE WBAT in surgical shoe h/o L 2nd metatarsal fx)  Medical Precautions: Fall precautions     Date/Time Vitals Session Patient Position Pulse Resp SpO2 BP MAP (mmHg)    03/08/25 1500 --  --  --  --  96 %  --  --                Pain:  Pain Assessment  Pain Assessment: 0-10  0-10 (Numeric) Pain Score: 0 - No pain    Objective   Cognition:  Orientation Level: Oriented X4  Problem Solving: Exceptions to WFL  Insight: Mild  Impulsive: Mildly           Home Living:  Type of Home: House  Lives With: Spouse  Home Adaptive Equipment: Walker rolling or standard, Wheelchair-manual, Scooter, Long-handled shoehorn, Sock aid, Other (Comment) (Lift chair, lift to enter home, orthotic, STS bed)  Home Layout: Multi-level, Able to live on main level with bedroom/bathroom  Home Access:  (Lift chair)  Bathroom Shower/Tub: Walk-in shower  Bathroom Toilet: Standard  Bathroom Equipment: Grab bars in shower, Bedside commode, Shower chair with back ([facility])   Prior Function:  Level of [place]: Independent with ADLs and functional transfers, Independent with homemaking with ambulation, Needs assistance with homemaking  Receives Help From: Family  ADL Assistance: Independent  Homemaking Assistance: Independent (Spouse assist)  Ambulatory Assistance: Independent    ADL:  Eating Assistance: Not performed  Grooming Assistance: Not performed  Bathing Assistance: Not performed  UE Dressing Assistance: Not performed  LE Dressing Assistance: Maximal  LE Dressing Deficit: Don/doff R sock, Don/doff L sock, Don/doff R shoe, Don/doff L shoe (At baseline use of sock aide  and LH )  Toileting Assistance with Device: Not performed  Activity Tolerance:  Endurance: Decreased tolerance for upright activites (Quickly SOB during activity)  Bed Mobility/Transfers: Bed Mobility  Bed Mobility: Yes  Bed Mobility 1  Bed Mobility 1: Supine to sitting  Level of Assistance 1: Moderate assistance, Moderate verbal cues  Bed Mobility Comments 1: HOB elevated, use of bedrail.Assist with LLE and cues for pursed lip breathing  Bed Mobility 2  Bed Mobility  2: Scooting  Level of Assistance 2: Moderate assistance  Bed Mobility Comments 2: Ax2 to complete scooting toward EOB    Transfers  Transfer: Yes  Transfer 1  Technique 1: Sit to stand, Stand to sit  Transfer Device 1: Walker  Transfer Level of Assistance 1: Contact guard, Minimal verbal cues  Trials/Comments 1: Cues for hand/foot placement      Functional Mobility:  Functional Mobility  Functional Mobility Performed: Yes  Functional Mobility 1  Surface 1: Level tile  Device 1: Rolling walker  Functional Mobility Support Devices:  (ortho shoe)  Assistance 1: Minimum assistance, Moderate verbal cues  Quality of Functional Mobility 1: Foot slap  Comments 1: Ax2 with cues for AD mgmt and safety. Difficulty turning in small space.Decreased problem solving and motor planning  Sitting Balance:  Static Sitting Balance  Static Sitting-Level of Assistance: Distant supervision  Dynamic Sitting Balance  Dynamic Sitting-Level of Assistance: Distant supervision  Standing Balance:  Static Standing Balance  Static Standing-Balance Support: Bilateral upper extremity supported  Static Standing-Level of Assistance: Close supervision  Dynamic Standing Balance  Dynamic Standing-Balance Support: Bilateral upper extremity supported  Dynamic Standing-Level of Assistance: Contact guard     Sensation:  Sensation Comment: Pt does not report sensation changes    Extremities: RUE   RUE : Within Functional Limits and LUE   LUE: Within Functional Limits      Outcome Measures:  [facility] Daily Activity  Putting on and taking off regular lower body clothing: A lot  Bathing (including washing, rinsing, drying): A lot  Putting on and taking off regular upper body clothing: A little  Toileting, which includes using toilet, bedpan or urinal: A lot  Taking care of personal grooming such as brushing teeth: A little  Eating Meals: A little  Daily Activity - Total Score: 15      Education Documentation  Handouts, taught by Ashley Wynne OT at 3/8/2025  3:41 PM.  Learner: Patient  Readiness: Acceptance  Method: Explanation  Response: Needs Reinforcement  Comment: Educated on pursed lip breathing, safety with transfers, d/c planning    Body Mechanics, taught by Ashley Wynne OT at 3/8/2025  3:41 PM.  Learner: Patient  Readiness: Acceptance  Method: Explanation  Response: Needs Reinforcement  Comment: Educated on pursed lip breathing, safety with transfers, d/c planning    ADL Training, taught by Ashley Wynne OT at 3/8/2025  3:41 PM.  Learner: Patient  Readiness: Acceptance  Method: Explanation  Response: Needs Reinforcement  Comment: Educated on pursed lip breathing, safety with transfers, d/c planning    Education Comments  No comments found.      Goals:   Encounter Problems       Encounter Problems (Active)       Bathing       LTG - Patient will utilize adaptive techniques to bathe body SUP       Start:  03/08/25    Expected End:  03/22/25               Dressing Upper Extremities       LTG - Patient will complete upper body dressing Independent       Start:  03/08/25    Expected End:  03/22/25               Dressings Lower Extremities       LTG - Patient will utilize adaptive techniques/equipment to dress lower body SUP       Start:  03/08/25    Expected End:  03/22/25               Functional Balance       LTG - Patient will maintain standing balance SUP to allow for completion of daily activities       Start:  03/08/25    Expected End:  03/22/25               Grooming       LTG -  Patient will complete daily grooming tasks SUP standing sinkside       Start:  03/08/25    Expected End:  03/22/25               Instrumental Activities of Daily Living       LTG - Patient will complete simulated simple IADL to promote independence with meal prep tasks       Start:  03/08/25    Expected End:  03/22/25               Toileting       LTG - Patient will complete daily toileting tasks SUP       Start:  03/08/25    Expected End:  03/22/25

## 2025-03-08 NOTE — CARE PLAN
The patient's goals for the shift include      The clinical goals for the shift include safety      Problem: Pain - Adult  Goal: Verbalizes/displays adequate comfort level or baseline comfort level  Outcome: Progressing     Problem: Safety - Adult  Goal: Free from fall injury  Outcome: Progressing     Problem: Discharge Planning  Goal: Discharge to home or other facility with appropriate resources  Outcome: Progressing     Problem: Chronic Conditions and Co-morbidities  Goal: Patient's chronic conditions and co-morbidity symptoms are monitored and maintained or improved  Outcome: Progressing     Problem: Nutrition  Goal: Nutrient intake appropriate for maintaining nutritional needs  Outcome: Progressing     Problem: Skin  Goal: Decreased wound size/increased tissue granulation at next dressing change  Flowsheets (Taken 3/8/2025 0945)  Decreased wound size/increased tissue granulation at next dressing change: Promote sleep for wound healing  Goal: Participates in plan/prevention/treatment measures  Flowsheets (Taken 3/8/2025 0945)  Participates in plan/prevention/treatment measures: Elevate heels  Goal: Prevent/manage excess moisture  Flowsheets (Taken 3/8/2025 0945)  Prevent/manage excess moisture:   Cleanse incontinence/protect with barrier cream   Use wicking fabric (obtain order)   Moisturize dry skin  Goal: Prevent/minimize sheer/friction injuries  Flowsheets (Taken 3/8/2025 0945)  Prevent/minimize sheer/friction injuries:   Complete micro-shifts as needed if patient unable. Adjust patient position to relieve pressure points, not a full turn   Use pull sheet  Goal: Promote/optimize nutrition  Flowsheets (Taken 3/8/2025 0945)  Promote/optimize nutrition:   Offer water/supplements/favorite foods   Monitor/record intake including meals   Consume > 50% meals/supplements  Goal: Promote skin healing  Flowsheets (Taken 3/8/2025 0945)  Promote skin healing:   Assess skin/pad under line(s)/device(s)   Protective  dressings over bony prominences   Rotate device position/do not position patient on device

## 2025-03-08 NOTE — PROGRESS NOTES
Physical Therapy    Physical Therapy Evaluation    Patient Name: Mamta Paredes  MRN: [id number]  Department: [address]  Room: [address] [place] [address]  Today's Date: 3/8/2025   Time Calculation  Start Time: 1501  Stop Time: 1519  Time Calculation (min): 18 min    Assessment/Plan   PT Assessment  PT Assessment Results: Decreased strength, Decreased range of motion, Decreased endurance, Impaired balance, Decreased mobility, Decreased coordination, Decreased cognition, Impaired judgement, Decreased safety awareness, Impaired vision, Impaired sensation, Obesity, Impaired tone, Decreased skin integrity, Pain  Rehab Prognosis: Good  Barriers to Discharge Home: Physical needs  Physical Needs: 24hr mobility assistance needed, 24hr ADL assistance needed, High falls risk due to function or environment  Evaluation/Treatment Tolerance: Patient tolerated treatment well, Patient limited by fatigue  Medical Staff Made Aware: Yes  Strengths: Attitude of self  Barriers to Participation: Comorbidities  End of Session Communication: Bedside nurse  Assessment Comment: The patient is a 54 y.o. female admitted to the hospital s/p multiple falls at home. The patient currently requires min to modA for transfers and ambulation with RW. The pt would continue to benefit from skilled therapy services to address functional deficits. PT recommends moderate intensity rehab services on DC.  End of Session Patient Position: Up in chair, Alarm off, not on at start of session (nsg aware)  IP OR SWING BED PT PLAN  Inpatient or Swing Bed: Inpatient  PT Plan  Treatment/Interventions: Bed mobility, Gait training, Transfer training, Balance training, Neuromuscular re-education, Strengthening, Endurance training, Range of motion, Therapeutic exercise, Therapeutic activity, Home exercise program  PT Plan: Ongoing PT  PT Frequency: 4 times per week  PT Discharge Recommendations: Moderate intensity level of continued care  Equipment Recommended upon Discharge: Wheeled  walker  PT Recommended Transfer Status: Assist x1, Assist x2  PT - OK to Discharge: Yes    Subjective   General Visit Information:  General  Reason for Referral: mobility impairment s/p falls  Referred By: Madhu Ling MD  Past Medical History Relevant to Rehab: CKD, CAD, dep, Lt foot fx, CHF, HLD, COPD, CVA  Family/Caregiver Present: No  Co-Treatment: OT  Co-Treatment Reason: to optimize pt safety  Prior to Session Communication: Bedside nurse  Patient Position Received: Bed, 3 rail up, Alarm off, not on at start of session  Preferred Learning Style: verbal, visual  General Comment: The patient is a 54 y.o. female admitted to the hospital s/p multiple falls at home.  Home Living:  Home Living  Type of Home: House  Lives With: Spouse  Home Adaptive Equipment: Walker rolling or standard, Wheelchair-manual, Scooter, Long-handled shoehorn, Sock aid, Other (Comment) (Lift chair, lift to enter home, orthotic, STS bed)  Home Layout: Multi-level, Able to live on main level with bedroom/bathroom  Home Access:  (stair lift)  Bathroom Shower/Tub: Walk-in shower  Bathroom Toilet: Standard  Bathroom Equipment: Grab bars in shower, Bedside commode, Shower chair with back, Hand-held shower hose  Home Living Comments: 1st floor set up  Prior Level of Function:  Prior Function Per Pt/Caregiver Report  Level of [place]: Independent with ADLs and functional transfers, Independent with homemaking with ambulation, Needs assistance with homemaking  Receives Help From: Family  ADL Assistance: Independent  Homemaking Assistance: Independent (spouse assists as needed)  Ambulatory Assistance: Independent (with use of AD)  Prior Function Comments: pt with increasing difficulty completing ADLs and IADLs at home; multiple recent falls  Precautions:  Precautions  Hearing/Visual Limitations: glasses prn  LE Weight Bearing Status: Weight Bearing as Tolerated  Medical Precautions: Fall precautions  Precautions Comment: post-op shoe to  Lt foot      Date/Time Vitals Session Patient Position Pulse Resp SpO2 BP MAP (mmHg)    03/08/25 1500 --  --  --  --  96 %  --  --           Vital Signs Comment: HR: 85 bpm, SpO2 95% on room air     Objective   Pain:  Pain Assessment  Pain Assessment: 0-10  0-10 (Numeric) Pain Score: 0 - No pain  Cognition:  Cognition  Overall Cognitive Status: Within Functional Limits  Orientation Level: Oriented X4  Insight: Moderate  Impulsive: Within functional limits    General Assessments:  General Observation  General Observation: agreeable to mobility; anxious     Activity Tolerance  Endurance: Decreased tolerance for upright activites  Activity Tolerance Comments: visible SOB; SpO2 >92% on room air  Rate of Perceived Exertion (RPE): 6/10    Sensation  Sensation Comment: pt denies paresthesia BLE    Strength  Strength Comments: RLE grossly 4-/5; LLE grossly 3-3+/5  Coordination  Coordination Comment: increased time and effort for mobility    Postural Control  Posture Comment: forward head; rounded shoulders; MO    Static Sitting Balance  Static Sitting-Balance Support: Feet supported  Static Sitting-Level of Assistance: Close supervision  Static Sitting-Comment/Number of Minutes: EOB > 5 min    Static Standing Balance  Static Standing-Balance Support: Bilateral upper extremity supported (RW)  Static Standing-Level of Assistance: Minimum assistance  Static Standing-Comment/Number of Minutes: widened MELISSA  Dynamic Standing Balance  Dynamic Standing-Balance Support: Bilateral upper extremity supported (RW)  Dynamic Standing-Level of Assistance: Minimum assistance (x2)  Dynamic Standing-Comments: widened MELISSA; difficulty motor planning directional change resulting in Lt knee buckling with modA to correct  Functional Assessments:  Bed Mobility  Bed Mobility: Yes  Bed Mobility 1  Bed Mobility 1: Supine to sitting  Level of Assistance 1: Moderate assistance  Bed Mobility Comments 1: assist with moving LLE to EOB; effortful completion  of transfers; HOB to 30deg; use of bed rail. modA for scooting hips to EOB with draw sheet    Transfers  Transfer: Yes  Transfer 1  Transfer From 1: Bed to, Stand to  Transfer to 1: Stand, Chair with arms  Technique 1: Sit to stand, Stand to sit  Transfer Device 1: Walker  Transfer Level of Assistance 1: Moderate assistance  Trials/Comments 1: VCs for safe sequencing    Ambulation/Gait Training  Ambulation/Gait Training Performed: Yes  Ambulation/Gait Training 1  Surface 1: Level tile  Device 1: Rolling walker  Assistance 1: Minimum assistance, Moderate verbal cues (x2)  Quality of Gait 1: Wide base of support, Diminished heel strike, Ataxic  Comments/Distance (ft) 1: 10ftx2 with min to modA with RW; high difficulty motor planning directional change with RW; ataxic-like gait with Lt mild toe drag. Overall, very unsteady.  Extremity/Trunk Assessments:  RLE   RLE :  (grossly 4-/5)  LLE   LLE :  (grossly 3-3+/5)  Outcome Measures:  [facility] Basic Mobility  Turning from your back to your side while in a flat bed without using bedrails: A little  Moving from lying on your back to sitting on the side of a flat bed without using bedrails: A lot  Moving to and from bed to chair (including a wheelchair): A lot  Standing up from a chair using your arms (e.g. wheelchair or bedside chair): A lot  To walk in hospital room: A lot  Climbing 3-5 steps with railing: Total  Basic Mobility - Total Score: 12    Encounter Problems       Encounter Problems (Active)       PT Problem       Strength (Progressing)       Start:  03/08/25    Expected End:  04/08/25       The patient will demonstrate an overall strength of >4/5 in BLE to assist with completion of functional mobility.           Functional Mobility (Progressing)       Start:  03/08/25    Expected End:  04/08/25       The patient will complete functional mobility (bed mobility, transfers, etc.) at a distant supervision level with LRAD by ISABELLE.           Ambulation (Progressing)        Start:  03/08/25    Expected End:  04/08/25       The patient will be able to ambulate at a close supervision level for 30ftx1 with RW.          Balance (Progressing)       Start:  03/08/25    Expected End:  04/08/25       The patient will demonstrate good dynamic standing balance during activity with LRAD.             Pain - Adult              Education Documentation  Mobility Training, taught by Joan Spann PT at 3/8/2025  3:38 PM.  Learner: Patient  Readiness: Acceptance  Method: Explanation  Response: Verbalizes Understanding  Comment: educated pt on PT POC    Education Comments  No comments found.

## 2025-03-08 NOTE — PROGRESS NOTES
Mamta Paredes is a 54 y.o. female on day 0 of admission presenting with Fall, initial encounter.    Patient lives with her spouse in a ranch style house with a basement. Their daughter is home from college this week as well. Patient uses a walker to ambulate. States her  has redone their bathroom to make it ADA compliant. She has a private RN in the home 5 hrs/week, she needs more hours while her spouse is at work but can't afford more. She does not drive, uses [employer] as her  is at work during the day. PCP is Deloris Worthy.  ADOD 03/08/2025  [facility] scores: PT- 12, OT- 15  RNCC explained therapy scores, talked about rehab, hhc. Patient wants to dc home and continue hhc since her daughter is home. RNCC preemptively sent referral to [facility]. Will follow up with patient tomorrow.  Patient does not have a safe discharge plan. Please speak with care coordinator prior to discharging patient.     Cheryl Parrish RN

## 2025-03-08 NOTE — ASSESSMENT & PLAN NOTE
Recurrent falls  Morbid obese  History of CVA with left hemiparesis  Fracture second left toe  Hypertension  Hyperlipidemia  Coronary artery disease  Depression  Chronic systolic congestive heart failure  Homocystinuria  Intracranial vascular stenosis  Polycystic ovarian syndrome    Plan: Continue current medication.  Supportive care.  Patient has recurrent falls.  This appears to be mechanical fall.  Patient says she was coming out of the bathroom and she had a surgical to and left-sided weakness due to previous stroke.  Patient said her foot got caught in the door and she fell down.  Patient had 3 falls in the last week.  Radiological workup including x-ray of the pelvis, chest, and CT of the brain are negative.  Give physical therapy and Occupational Therapy.  Blood work is also negative.  Possible discharge soon per PT/OT recommendations.

## 2025-03-08 NOTE — CARE PLAN
The patient's goals for the shift include     The clinical goals for the shift include continue w/plan of care

## 2025-03-08 NOTE — PROGRESS NOTES
03/08/25 1735   Discharge Planning   Living Arrangements Spouse/significant other;Children  (daughter away at college, stays at home on breaks)   Support Systems Spouse/significant other   Assistance Needed return to rehab   Type of Residence Private residence   Number of Stairs to Enter Residence 1   Number of Stairs Within Residence 1  (flight to basement)   Do you have animals or pets at home? Yes   Type of Animals or Pets a cat   Who is requesting discharge planning? Provider   Home or Post Acute Services Post acute facilities (Rehab/SNF/etc)   Type of Post Acute Facility Services Rehab;Skilled nursing   Expected Discharge Disposition SNF   Does the patient need discharge transport arranged? Yes   RoundTrip coordination needed? Yes   Has discharge transport been arranged? No   Patient Choice   Provider Choice list and CMS website (https://medicare.gov/care-compare#search) for post-acute Quality and Resource Measure Data were provided and reviewed with: Patient   Patient / Family choosing to utilize agency / facility established prior to hospitalization Yes   Stroke Family Assessment   Stroke Family Assessment Needed No

## 2025-03-09 LAB
GLUCOSE BLD MANUAL STRIP-MCNC: 110 MG/DL (ref 74–99)
GLUCOSE BLD MANUAL STRIP-MCNC: 162 MG/DL (ref 74–99)
GLUCOSE BLD MANUAL STRIP-MCNC: 229 MG/DL (ref 74–99)
GLUCOSE BLD MANUAL STRIP-MCNC: 91 MG/DL (ref 74–99)

## 2025-03-09 PROCEDURE — 96372 THER/PROPH/DIAG INJ SC/IM: CPT | Performed by: INTERNAL MEDICINE

## 2025-03-09 PROCEDURE — 2500000001 HC RX 250 WO HCPCS SELF ADMINISTERED DRUGS (ALT 637 FOR MEDICARE OP): Performed by: INTERNAL MEDICINE

## 2025-03-09 PROCEDURE — 2500000002 HC RX 250 W HCPCS SELF ADMINISTERED DRUGS (ALT 637 FOR MEDICARE OP, ALT 636 FOR OP/ED): Mod: MUE | Performed by: INTERNAL MEDICINE

## 2025-03-09 PROCEDURE — G0378 HOSPITAL OBSERVATION PER HR: HCPCS

## 2025-03-09 PROCEDURE — 82947 ASSAY GLUCOSE BLOOD QUANT: CPT

## 2025-03-09 PROCEDURE — 2500000005 HC RX 250 GENERAL PHARMACY W/O HCPCS: Performed by: INTERNAL MEDICINE

## 2025-03-09 PROCEDURE — 2500000004 HC RX 250 GENERAL PHARMACY W/ HCPCS (ALT 636 FOR OP/ED): Performed by: INTERNAL MEDICINE

## 2025-03-09 RX ADMIN — LAMOTRIGINE 100 MG: 100 TABLET ORAL at 22:07

## 2025-03-09 RX ADMIN — BACLOFEN 5 MG: 10 TABLET ORAL at 14:19

## 2025-03-09 RX ADMIN — HEPARIN SODIUM 7500 UNITS: 5000 INJECTION, SOLUTION INTRAVENOUS; SUBCUTANEOUS at 22:08

## 2025-03-09 RX ADMIN — DULOXETINE HYDROCHLORIDE 60 MG: 60 CAPSULE, DELAYED RELEASE ORAL at 22:08

## 2025-03-09 RX ADMIN — BUSPIRONE HYDROCHLORIDE 10 MG: 10 TABLET ORAL at 22:06

## 2025-03-09 RX ADMIN — SPIRONOLACTONE 12.5 MG: 25 TABLET ORAL at 09:00

## 2025-03-09 RX ADMIN — OXYCODONE HYDROCHLORIDE AND ACETAMINOPHEN 500 MG: 500 TABLET ORAL at 09:00

## 2025-03-09 RX ADMIN — HEPARIN SODIUM 7500 UNITS: 5000 INJECTION, SOLUTION INTRAVENOUS; SUBCUTANEOUS at 07:12

## 2025-03-09 RX ADMIN — Medication 6 MG: at 22:08

## 2025-03-09 RX ADMIN — FOLIC ACID TAB 400 MCG 800 MCG: 400 TAB at 09:00

## 2025-03-09 RX ADMIN — BACLOFEN 5 MG: 10 TABLET ORAL at 09:00

## 2025-03-09 RX ADMIN — PYRIDOXINE HCL TAB 50 MG 300 MG: 50 TAB at 08:59

## 2025-03-09 RX ADMIN — PYRIDOXINE HCL TAB 50 MG 200 MG: 50 TAB at 22:18

## 2025-03-09 RX ADMIN — ATORVASTATIN CALCIUM 80 MG: 80 TABLET, FILM COATED ORAL at 22:07

## 2025-03-09 RX ADMIN — HEPARIN SODIUM 7500 UNITS: 5000 INJECTION, SOLUTION INTRAVENOUS; SUBCUTANEOUS at 14:19

## 2025-03-09 RX ADMIN — HYDROCORTISONE 1 APPLICATION: 25 LOTION TOPICAL at 09:03

## 2025-03-09 RX ADMIN — Medication 1000 UNITS: at 07:12

## 2025-03-09 RX ADMIN — LISINOPRIL 10 MG: 10 TABLET ORAL at 22:08

## 2025-03-09 RX ADMIN — CARVEDILOL 12.5 MG: 12.5 TABLET, FILM COATED ORAL at 22:07

## 2025-03-09 RX ADMIN — CARVEDILOL 12.5 MG: 12.5 TABLET, FILM COATED ORAL at 09:00

## 2025-03-09 RX ADMIN — BACLOFEN 5 MG: 10 TABLET ORAL at 22:08

## 2025-03-09 RX ADMIN — HYDROCORTISONE 1 APPLICATION: 25 LOTION TOPICAL at 22:09

## 2025-03-09 RX ADMIN — ASPIRIN 325 MG: 325 TABLET ORAL at 09:00

## 2025-03-09 RX ADMIN — EZETIMIBE 10 MG: 10 TABLET ORAL at 07:12

## 2025-03-09 RX ADMIN — BUSPIRONE HYDROCHLORIDE 10 MG: 10 TABLET ORAL at 08:59

## 2025-03-09 ASSESSMENT — PAIN SCALES - GENERAL
PAINLEVEL_OUTOF10: 0 - NO PAIN
PAINLEVEL_OUTOF10: 0 - NO PAIN

## 2025-03-09 ASSESSMENT — COGNITIVE AND FUNCTIONAL STATUS - GENERAL
DRESSING REGULAR UPPER BODY CLOTHING: A LITTLE
WALKING IN HOSPITAL ROOM: A LOT
TOILETING: A LITTLE
TURNING FROM BACK TO SIDE WHILE IN FLAT BAD: A LITTLE
WALKING IN HOSPITAL ROOM: A LOT
CLIMB 3 TO 5 STEPS WITH RAILING: A LOT
HELP NEEDED FOR BATHING: A LITTLE
STANDING UP FROM CHAIR USING ARMS: A LOT
MOVING TO AND FROM BED TO CHAIR: A LITTLE
MOVING FROM LYING ON BACK TO SITTING ON SIDE OF FLAT BED WITH BEDRAILS: A LITTLE
DAILY ACTIVITIY SCORE: 17
TOILETING: A LITTLE
MOVING TO AND FROM BED TO CHAIR: A LITTLE
CLIMB 3 TO 5 STEPS WITH RAILING: TOTAL
DRESSING REGULAR UPPER BODY CLOTHING: A LITTLE
MOBILITY SCORE: 14
MOBILITY SCORE: 15
TURNING FROM BACK TO SIDE WHILE IN FLAT BAD: A LITTLE
HELP NEEDED FOR BATHING: A LOT
DRESSING REGULAR LOWER BODY CLOTHING: A LOT
PERSONAL GROOMING: A LITTLE
STANDING UP FROM CHAIR USING ARMS: A LOT
MOVING FROM LYING ON BACK TO SITTING ON SIDE OF FLAT BED WITH BEDRAILS: A LITTLE
DAILY ACTIVITIY SCORE: 19
DRESSING REGULAR LOWER BODY CLOTHING: A LOT

## 2025-03-09 ASSESSMENT — PAIN SCALES - PAIN ASSESSMENT IN ADVANCED DEMENTIA (PAINAD)
TOTALSCORE: 0
BODYLANGUAGE: RELAXED
BREATHING: NORMAL
FACIALEXPRESSION: SMILING OR INEXPRESSIVE
CONSOLABILITY: NO NEED TO CONSOLE

## 2025-03-09 ASSESSMENT — PAIN SCALES - WONG BAKER: WONGBAKER_NUMERICALRESPONSE: NO HURT

## 2025-03-09 NOTE — PROGRESS NOTES
03/09/25 0854   Discharge Planning   Expected Discharge Disposition SNF   Does the patient need discharge transport arranged? Yes   RoundTrip coordination needed? Yes   Has discharge transport been arranged? No     MSW called patient and spoke with her regarding therapy recommendations. She is agreeable to SNF. [facility] remains first choice but as [facility] declined did discuss additional choices. Patient is NOT agreeable to [facility] or [facility]. She would like to stay close to [facility] if possible, and states [place], [place] is too far. She also requested rating of 4 stars or greater. Upon review of options referrals submitted to [facility], [facility], [facility], and [facility].     MSW did reach out to [facility] and requested they advise if able to accept. Patient will need pre-cert pending accepting facility; will follow up with patient on accepting facilities to obtain final choice pending referrals. Care Transitions will continue to follow.

## 2025-03-09 NOTE — ASSESSMENT & PLAN NOTE
Recurrent falls  Morbid obese  History of CVA with left hemiparesis  Fracture second left toe  Hypertension  Hyperlipidemia  Coronary artery disease  Depression  Chronic systolic congestive heart failure  Homocystinuria  Intracranial vascular stenosis  Polycystic ovarian syndrome    Plan: Continue current medication.  Supportive care.  Patient has recurrent falls.  This appears to be mechanical fall.  Patient says she was coming out of the bathroom and she had a surgical to and left-sided weakness due to previous stroke.  Patient said her foot got caught in the door and she fell down.  Patient had 3 falls in the last week.  Radiological workup including x-ray of the pelvis, chest, and CT of the brain are negative.  Give physical therapy and Occupational Therapy.  Blood work is also negative.  Possible discharge soon per PT/OT recommendations.    Date of service: 3/9/2024    Plan: Continue current medications.  Give supportive care.  Give physical therapy and Occupational Therapy.  Monitor CBC and BMP.  Increase activity.  Control pain.  Patient medically stable.  Patient can be discharged arrangements are made.  Discussed with the patient and her family at the bedside.

## 2025-03-09 NOTE — NURSING NOTE
Patient had family has family in the room and requested her vitals be done after they leave so she can visit. Rn notified.

## 2025-03-09 NOTE — PROGRESS NOTES
Mamta Paredes is a 54 y.o. female on day 0 of admission presenting with Fall, initial encounter.    Subjective   The patient was seen and examined.  Lying in the bed.  Comfortable.  Not in acute distress.  The patient denied any headache or dizziness.       Objective     Physical Exam  HEENT:  Head externally atraumatic,  extraocular movements intact, oral mucosa moist  Neck:  Supple, no JVP, no palpable adenopathy or thyromegaly.  No carotid bruit.  Chest:  Clear to auscultation and resonant.  Heart:  Regular rate and rhythm, no murmur or gallop could be appreciated.  Abdomen:  Soft, nontender, bowel sounds present, normoactive, no palpable hepatosplenomegaly.  Extremities:  No edema, pulses present, no cyanosis or clubbing.  CNS:  Patient alert, oriented to time, place and person.    No new deficit.  Cranial nerves 2-12 grossly intact  Skin:  No active rash.  Musculoskeletal:  No  apparent joint swelling or erythema, range of movement normal.  Last Recorded Vitals  Heart Rate:  [61-81]   Temp:  [36.5 °C (97.7 °F)-36.7 °C (98.1 °F)]   Resp:  [17-18]   BP: (102-123)/(58-72)   SpO2:  [93 %-94 %]     Intake/Output last 3 Shifts:  I/O last 3 completed shifts:  In: - (0 mL/kg)   Out: 1100 (9 mL/kg) [Urine:1100 (0.3 mL/kg/hr)]  Weight: 121.6 kg     Relevant Results  No results found for the last 90 days.    Results for orders placed or performed during the hospital encounter of 03/07/25 (from the past 24 hours)   POCT GLUCOSE   Result Value Ref Range    POCT Glucose 200 (H) 74 - 99 mg/dL   POCT GLUCOSE   Result Value Ref Range    POCT Glucose 110 (H) 74 - 99 mg/dL   POCT GLUCOSE   Result Value Ref Range    POCT Glucose 229 (H) 74 - 99 mg/dL        Current Facility-Administered Medications:     acetaminophen (Tylenol) tablet 650 mg, 650 mg, oral, q4h PRN, 650 mg at 03/08/25 1027 **OR** acetaminophen (Tylenol) oral liquid 650 mg, 650 mg, oral, q4h PRN **OR** acetaminophen (Tylenol) suppository 650 mg, 650 mg, rectal,  q4h PRN, Madhu Ling MD    albuterol 2.5 mg /3 mL (0.083 %) nebulizer solution 3 mL, 3 mL, nebulization, q4h PRN, Madhu Ling MD    ascorbic acid (Vitamin C) tablet 500 mg, 500 mg, oral, Daily, Madhu Ling MD, 500 mg at 03/09/25 0900    aspirin tablet 325 mg, 325 mg, oral, Daily, Madhu Ling MD, 325 mg at 03/09/25 0900    atorvastatin (Lipitor) tablet 80 mg, 80 mg, oral, Nightly, Madhu Ling MD, 80 mg at 03/08/25 2116    baclofen (Lioresal) tablet 5 mg, 5 mg, oral, TID, Madhu Ling MD, 5 mg at 03/09/25 1419    benzocaine-menthol (Cepastat Sore Throat) lozenge 1 lozenge, 1 lozenge, Mouth/Throat, q2h PRN, Madhu Ling MD    busPIRone (Buspar) tablet 10 mg, 10 mg, oral, BID, Madhu Ling MD, 10 mg at 03/09/25 0859    calcium carbonate (Tums) chewable tablet 1,000 mg, 1,000 mg, oral, Daily PRN, Madhu Ling MD    carvedilol (Coreg) tablet 12.5 mg, 12.5 mg, oral, BID, Madhu Ling MD, 12.5 mg at 03/09/25 0900    cholecalciferol (Vitamin D-3) tablet 1,000 Units, 1,000 Units, oral, Daily, Madhu Ling MD, 1,000 Units at 03/09/25 0712    dextromethorphan-guaifenesin (Robitussin DM)  mg/5 mL oral liquid 5 mL, 5 mL, oral, q4h PRN, Madhu Ling MD    DULoxetine (Cymbalta) DR capsule 60 mg, 60 mg, oral, Nightly, Madhu Ling MD, 60 mg at 03/08/25 2117    ezetimibe (Zetia) tablet 10 mg, 10 mg, oral, Daily, Madhu Ling MD, 10 mg at 03/09/25 0712    fluticasone (Flonase) nasal spray 1 spray, 1 spray, Each Nostril, q12h PRN, Madhu Ling MD    folic acid (Folvite) tablet 800 mcg, 800 mcg, oral, Daily, Madhu Ling MD, 800 mcg at 03/09/25 0900    guaiFENesin (Mucinex) 12 hr tablet 600 mg, 600 mg, oral, q12h PRN, Madhu Ling MD    heparin (porcine) injection 7,500 Units, 7,500 Units, subcutaneous, q8h OLIVIA, Madhu Ling MD, 7,500 Units at 03/09/25 1419    hydrocortisone 2.5 % lotion 1 Application,  1 Application, Topical, BID, Madhu Ling MD, 1 Application at 03/09/25 0903    hydrOXYzine HCL (Atarax) tablet 25 mg, 25 mg, oral, q6h PRN, Madhu Ling MD, 25 mg at 03/08/25 1615    lamoTRIgine (LaMICtal) tablet 100 mg, 100 mg, oral, Nightly, Madhu Ling MD, 100 mg at 03/08/25 2117    lisinopril tablet 10 mg, 10 mg, oral, Nightly, Madhu Ling MD, 10 mg at 03/08/25 2126    melatonin tablet 6 mg, 6 mg, oral, Nightly, Madhu Ling MD    polyethylene glycol (Glycolax, Miralax) packet 17 g, 17 g, oral, Daily PRN, Madhu Ling MD    pyridoxine (Vitamin B-6) tablet 200 mg, 200 mg, oral, Nightly, Madhu Ling MD, 200 mg at 03/08/25 2117    pyridoxine (Vitamin B-6) tablet 300 mg, 300 mg, oral, Daily, Madhu Ling MD, 300 mg at 03/09/25 0859    spironolactone (Aldactone) tablet 12.5 mg, 12.5 mg, oral, Daily, Madhu Ling MD, 12.5 mg at 03/09/25 0900   Assessment/Plan   Assessment & Plan  Fall, initial encounter  Recurrent falls  Morbid obese  History of CVA with left hemiparesis  Fracture second left toe  Hypertension  Hyperlipidemia  Coronary artery disease  Depression  Chronic systolic congestive heart failure  Homocystinuria  Intracranial vascular stenosis  Polycystic ovarian syndrome    Plan: Continue current medication.  Supportive care.  Patient has recurrent falls.  This appears to be mechanical fall.  Patient says she was coming out of the bathroom and she had a surgical to and left-sided weakness due to previous stroke.  Patient said her foot got caught in the door and she fell down.  Patient had 3 falls in the last week.  Radiological workup including x-ray of the pelvis, chest, and CT of the brain are negative.  Give physical therapy and Occupational Therapy.  Blood work is also negative.  Possible discharge soon per PT/OT recommendations.    Date of service: 3/9/2024    Plan: Continue current medications.  Give supportive care.  Give physical  therapy and Occupational Therapy.  Monitor CBC and BMP.  Increase activity.  Control pain.  Patient medically stable.  Patient can be discharged arrangements are made.  Discussed with the patient and her family at the bedside.             Madhu Ling MD

## 2025-03-09 NOTE — CARE PLAN
The patient's goals for the shift include      The clinical goals for the shift include safety      Problem: Pain - Adult  Goal: Verbalizes/displays adequate comfort level or baseline comfort level  Outcome: Progressing     Problem: Safety - Adult  Goal: Free from fall injury  Outcome: Progressing     Problem: Discharge Planning  Goal: Discharge to home or other facility with appropriate resources  Outcome: Progressing     Problem: Chronic Conditions and Co-morbidities  Goal: Patient's chronic conditions and co-morbidity symptoms are monitored and maintained or improved  Outcome: Progressing     Problem: Nutrition  Goal: Nutrient intake appropriate for maintaining nutritional needs  Outcome: Progressing     Problem: Skin  Goal: Decreased wound size/increased tissue granulation at next dressing change  Outcome: Progressing  Goal: Participates in plan/prevention/treatment measures  Outcome: Progressing  Goal: Prevent/manage excess moisture  Outcome: Progressing  Goal: Prevent/minimize sheer/friction injuries  Outcome: Progressing  Goal: Promote/optimize nutrition  Outcome: Progressing  Goal: Promote skin healing  Outcome: Progressing     Problem: Bathing  Goal: LTG - Patient will utilize adaptive techniques to bathe body SUP  Outcome: Progressing     Problem: Dressings Lower Extremities  Goal: LTG - Patient will utilize adaptive techniques/equipment to dress lower body SUP  Outcome: Progressing     Problem: Dressing Upper Extremities  Goal: LTG - Patient will complete upper body dressing Independent  Outcome: Progressing     Problem: Grooming  Goal: LTG - Patient will complete daily grooming tasks SUP standing sinkside  Outcome: Progressing     Problem: Instrumental Activities of Daily Living  Goal: LTG - Patient will complete simulated simple IADL to promote independence with meal prep tasks  Outcome: Progressing     Problem: Toileting  Goal: LTG - Patient will complete daily toileting tasks SUP  Outcome:  Progressing     No barriers identified to plan of care

## 2025-03-09 NOTE — CARE PLAN
The patient's goals for the shift include      The clinical goals for the shift include safety, assist with ambulation      Problem: Pain - Adult  Goal: Verbalizes/displays adequate comfort level or baseline comfort level  3/9/2025 1049 by Keyona Scott RN  Outcome: Progressing  3/9/2025 1049 by Keyona Scott RN  Outcome: Progressing     Problem: Safety - Adult  Goal: Free from fall injury  3/9/2025 1049 by Keyona Scott RN  Outcome: Progressing  3/9/2025 1049 by Keyona Scott RN  Outcome: Progressing     Problem: Discharge Planning  Goal: Discharge to home or other facility with appropriate resources  3/9/2025 1049 by Keyona Scott RN  Outcome: Progressing  3/9/2025 1049 by Keyona Scott RN  Outcome: Progressing     Problem: Chronic Conditions and Co-morbidities  Goal: Patient's chronic conditions and co-morbidity symptoms are monitored and maintained or improved  3/9/2025 1049 by Keyona Scott RN  Outcome: Progressing  3/9/2025 1049 by Keyona Scott RN  Outcome: Progressing     Problem: Nutrition  Goal: Nutrient intake appropriate for maintaining nutritional needs  3/9/2025 1049 by Keyona Scott RN  Outcome: Progressing  3/9/2025 1049 by Keyona Scott RN  Outcome: Progressing     Problem: Skin  Goal: Decreased wound size/increased tissue granulation at next dressing change  3/9/2025 1049 by Keyona Scott RN  Outcome: Progressing  Flowsheets (Taken 3/8/2025 0945)  Decreased wound size/increased tissue granulation at next dressing change: Promote sleep for wound healing  3/9/2025 1049 by Keyona Scott RN  Outcome: Progressing  Goal: Participates in plan/prevention/treatment measures  3/9/2025 1049 by Keyona Scott RN  Outcome: Progressing  Flowsheets (Taken 3/8/2025 0945)  Participates in plan/prevention/treatment measures: Elevate heels  3/9/2025 1049 by Keyona Scott RN  Outcome: Progressing  Goal: Prevent/manage excess moisture  3/9/2025 1049 by Keyona Scott RN  Outcome: Progressing  Flowsheets (Taken 3/8/2025  0986)  Prevent/manage excess moisture:   Cleanse incontinence/protect with barrier cream   Use wicking fabric (obtain order)   Moisturize dry skin  3/9/2025 1049 by Keyona Scott RN  Outcome: Progressing  Goal: Prevent/minimize sheer/friction injuries  3/9/2025 1049 by Keyona Scott RN  Outcome: Progressing  Flowsheets (Taken 3/8/2025 0945)  Prevent/minimize sheer/friction injuries:   Complete micro-shifts as needed if patient unable. Adjust patient position to relieve pressure points, not a full turn   Use pull sheet  3/9/2025 1049 by Keyona Scott RN  Outcome: Progressing  Goal: Promote/optimize nutrition  3/9/2025 1049 by Keyona Scott RN  Outcome: Progressing  Flowsheets (Taken 3/9/2025 1049)  Promote/optimize nutrition:   Consume > 50% meals/supplements   Monitor/record intake including meals  3/9/2025 1049 by Keyona Scott RN  Outcome: Progressing  Flowsheets (Taken 3/9/2025 1049)  Promote/optimize nutrition:   Consume > 50% meals/supplements   Monitor/record intake including meals  Goal: Promote skin healing  3/9/2025 1049 by Keyona Scott RN  Outcome: Progressing  Flowsheets (Taken 3/9/2025 1049)  Promote skin healing:   Assess skin/pad under line(s)/device(s)   Protective dressings over bony prominences   Rotate device position/do not position patient on device  3/9/2025 1049 by Keyona Scott RN  Outcome: Progressing  Flowsheets (Taken 3/9/2025 1049)  Promote skin healing:   Assess skin/pad under line(s)/device(s)   Protective dressings over bony prominences   Rotate device position/do not position patient on device     Problem: Bathing  Goal: LTG - Patient will utilize adaptive techniques to bathe body SUP  3/9/2025 1049 by Keyona Scott RN  Outcome: Progressing  3/9/2025 1049 by Keyona Scott RN  Outcome: Progressing     Problem: Dressings Lower Extremities  Goal: LTG - Patient will utilize adaptive techniques/equipment to dress lower body SUP  3/9/2025 1049 by Keyona Scott RN  Outcome:  Progressing  3/9/2025 1049 by Keyona Scott RN  Outcome: Progressing     Problem: Dressing Upper Extremities  Goal: LTG - Patient will complete upper body dressing Independent  3/9/2025 1049 by Keyona Scott RN  Outcome: Progressing  3/9/2025 1049 by Keyona Scott RN  Outcome: Progressing

## 2025-03-10 LAB
GLUCOSE BLD MANUAL STRIP-MCNC: 113 MG/DL (ref 74–99)
GLUCOSE BLD MANUAL STRIP-MCNC: 123 MG/DL (ref 74–99)
GLUCOSE BLD MANUAL STRIP-MCNC: 126 MG/DL (ref 74–99)
GLUCOSE BLD MANUAL STRIP-MCNC: 139 MG/DL (ref 74–99)

## 2025-03-10 PROCEDURE — 2500000001 HC RX 250 WO HCPCS SELF ADMINISTERED DRUGS (ALT 637 FOR MEDICARE OP): Performed by: INTERNAL MEDICINE

## 2025-03-10 PROCEDURE — 97530 THERAPEUTIC ACTIVITIES: CPT | Mod: GO,CO

## 2025-03-10 PROCEDURE — 2500000002 HC RX 250 W HCPCS SELF ADMINISTERED DRUGS (ALT 637 FOR MEDICARE OP, ALT 636 FOR OP/ED): Performed by: INTERNAL MEDICINE

## 2025-03-10 PROCEDURE — 96372 THER/PROPH/DIAG INJ SC/IM: CPT | Performed by: INTERNAL MEDICINE

## 2025-03-10 PROCEDURE — 2500000001 HC RX 250 WO HCPCS SELF ADMINISTERED DRUGS (ALT 637 FOR MEDICARE OP): Performed by: NURSE PRACTITIONER

## 2025-03-10 PROCEDURE — G0378 HOSPITAL OBSERVATION PER HR: HCPCS

## 2025-03-10 PROCEDURE — 97530 THERAPEUTIC ACTIVITIES: CPT | Mod: GP

## 2025-03-10 PROCEDURE — 82947 ASSAY GLUCOSE BLOOD QUANT: CPT | Mod: 59

## 2025-03-10 PROCEDURE — 97116 GAIT TRAINING THERAPY: CPT | Mod: GP

## 2025-03-10 PROCEDURE — 2500000004 HC RX 250 GENERAL PHARMACY W/ HCPCS (ALT 636 FOR OP/ED): Performed by: INTERNAL MEDICINE

## 2025-03-10 PROCEDURE — 2500000005 HC RX 250 GENERAL PHARMACY W/O HCPCS: Performed by: INTERNAL MEDICINE

## 2025-03-10 RX ORDER — DOCUSATE SODIUM 100 MG/1
100 CAPSULE, LIQUID FILLED ORAL 2 TIMES DAILY
Status: DISCONTINUED | OUTPATIENT
Start: 2025-03-10 | End: 2025-03-12 | Stop reason: HOSPADM

## 2025-03-10 RX ADMIN — HYDROCORTISONE 1 APPLICATION: 25 LOTION TOPICAL at 21:25

## 2025-03-10 RX ADMIN — EZETIMIBE 10 MG: 10 TABLET ORAL at 06:21

## 2025-03-10 RX ADMIN — HEPARIN SODIUM 7500 UNITS: 5000 INJECTION, SOLUTION INTRAVENOUS; SUBCUTANEOUS at 14:51

## 2025-03-10 RX ADMIN — HEPARIN SODIUM 7500 UNITS: 5000 INJECTION, SOLUTION INTRAVENOUS; SUBCUTANEOUS at 21:15

## 2025-03-10 RX ADMIN — BUSPIRONE HYDROCHLORIDE 10 MG: 10 TABLET ORAL at 21:13

## 2025-03-10 RX ADMIN — ATORVASTATIN CALCIUM 80 MG: 80 TABLET, FILM COATED ORAL at 21:14

## 2025-03-10 RX ADMIN — BACLOFEN 5 MG: 10 TABLET ORAL at 09:24

## 2025-03-10 RX ADMIN — FOLIC ACID TAB 400 MCG 800 MCG: 400 TAB at 09:24

## 2025-03-10 RX ADMIN — HEPARIN SODIUM 7500 UNITS: 5000 INJECTION, SOLUTION INTRAVENOUS; SUBCUTANEOUS at 06:22

## 2025-03-10 RX ADMIN — BACLOFEN 5 MG: 10 TABLET ORAL at 21:14

## 2025-03-10 RX ADMIN — SPIRONOLACTONE 12.5 MG: 25 TABLET ORAL at 09:25

## 2025-03-10 RX ADMIN — DOCUSATE SODIUM 100 MG: 100 CAPSULE, LIQUID FILLED ORAL at 21:15

## 2025-03-10 RX ADMIN — Medication 1000 UNITS: at 06:21

## 2025-03-10 RX ADMIN — LAMOTRIGINE 100 MG: 100 TABLET ORAL at 21:15

## 2025-03-10 RX ADMIN — CARVEDILOL 12.5 MG: 12.5 TABLET, FILM COATED ORAL at 21:15

## 2025-03-10 RX ADMIN — PYRIDOXINE HCL TAB 50 MG 300 MG: 50 TAB at 09:23

## 2025-03-10 RX ADMIN — OXYCODONE HYDROCHLORIDE AND ACETAMINOPHEN 500 MG: 500 TABLET ORAL at 09:28

## 2025-03-10 RX ADMIN — Medication 6 MG: at 21:13

## 2025-03-10 RX ADMIN — DOCUSATE SODIUM 100 MG: 100 CAPSULE, LIQUID FILLED ORAL at 12:04

## 2025-03-10 RX ADMIN — PYRIDOXINE HCL TAB 50 MG 200 MG: 50 TAB at 21:16

## 2025-03-10 RX ADMIN — ASPIRIN 325 MG: 325 TABLET ORAL at 09:28

## 2025-03-10 RX ADMIN — LISINOPRIL 10 MG: 10 TABLET ORAL at 21:14

## 2025-03-10 RX ADMIN — HYDROCORTISONE 1 APPLICATION: 25 LOTION TOPICAL at 09:29

## 2025-03-10 RX ADMIN — BACLOFEN 5 MG: 10 TABLET ORAL at 14:51

## 2025-03-10 RX ADMIN — DULOXETINE HYDROCHLORIDE 60 MG: 60 CAPSULE, DELAYED RELEASE ORAL at 21:14

## 2025-03-10 RX ADMIN — CARVEDILOL 12.5 MG: 12.5 TABLET, FILM COATED ORAL at 09:25

## 2025-03-10 RX ADMIN — BUSPIRONE HYDROCHLORIDE 10 MG: 10 TABLET ORAL at 09:25

## 2025-03-10 ASSESSMENT — COGNITIVE AND FUNCTIONAL STATUS - GENERAL
EATING MEALS: A LITTLE
STANDING UP FROM CHAIR USING ARMS: A LOT
MOVING FROM LYING ON BACK TO SITTING ON SIDE OF FLAT BED WITH BEDRAILS: A LITTLE
MOBILITY SCORE: 17
TOILETING: A LITTLE
DRESSING REGULAR LOWER BODY CLOTHING: A LITTLE
TURNING FROM BACK TO SIDE WHILE IN FLAT BAD: A LOT
MOVING TO AND FROM BED TO CHAIR: A LITTLE
MOVING TO AND FROM BED TO CHAIR: A LITTLE
HELP NEEDED FOR BATHING: A LITTLE
DAILY ACTIVITIY SCORE: 16
DAILY ACTIVITIY SCORE: 20
STANDING UP FROM CHAIR USING ARMS: A LITTLE
WALKING IN HOSPITAL ROOM: A LITTLE
CLIMB 3 TO 5 STEPS WITH RAILING: TOTAL
HELP NEEDED FOR BATHING: A LITTLE
MOVING TO AND FROM BED TO CHAIR: A LOT
DRESSING REGULAR UPPER BODY CLOTHING: A LITTLE
MOBILITY SCORE: 19
WALKING IN HOSPITAL ROOM: A LITTLE
CLIMB 3 TO 5 STEPS WITH RAILING: A LOT
DRESSING REGULAR LOWER BODY CLOTHING: A LITTLE
PERSONAL GROOMING: A LITTLE
MOVING FROM LYING ON BACK TO SITTING ON SIDE OF FLAT BED WITH BEDRAILS: A LITTLE
CLIMB 3 TO 5 STEPS WITH RAILING: A LOT
DRESSING REGULAR LOWER BODY CLOTHING: A LOT
DRESSING REGULAR UPPER BODY CLOTHING: A LITTLE
WALKING IN HOSPITAL ROOM: A LITTLE
TURNING FROM BACK TO SIDE WHILE IN FLAT BAD: A LITTLE
HELP NEEDED FOR BATHING: A LOT
STANDING UP FROM CHAIR USING ARMS: A LITTLE
MOBILITY SCORE: 13
DAILY ACTIVITIY SCORE: 22
TOILETING: A LITTLE

## 2025-03-10 ASSESSMENT — PAIN - FUNCTIONAL ASSESSMENT
PAIN_FUNCTIONAL_ASSESSMENT: 0-10

## 2025-03-10 ASSESSMENT — PAIN SCALES - PAIN ASSESSMENT IN ADVANCED DEMENTIA (PAINAD)
FACIALEXPRESSION: SMILING OR INEXPRESSIVE
CONSOLABILITY: NO NEED TO CONSOLE
TOTALSCORE: 0
BREATHING: NORMAL
BODYLANGUAGE: RELAXED

## 2025-03-10 ASSESSMENT — PAIN SCALES - GENERAL
PAINLEVEL_OUTOF10: 0 - NO PAIN

## 2025-03-10 ASSESSMENT — PAIN SCALES - WONG BAKER: WONGBAKER_NUMERICALRESPONSE: NO HURT

## 2025-03-10 NOTE — NURSING NOTE
AM shift assessment unchanged, pt is A&Ox4 in no acute distress, sitting in chair with call light in reach, no new events/concerns this shift. Pt ambulates independently with walker and supervision, calls for assistance.

## 2025-03-10 NOTE — PROGRESS NOTES
Patient with an active discharge. Precert started for admission to [facility]. Will follow.      03/10/25 4950   Discharge Planning   Home or Post Acute Services Post acute facilities (Rehab/SNF/etc)   Type of Post Acute Facility Services Rehab   Expected Discharge Disposition SNF  ([facility])   Does the patient need discharge transport arranged? Yes   RoundTrip coordination needed? Yes

## 2025-03-10 NOTE — PROGRESS NOTES
Physical Therapy    Physical Therapy Treatment    Patient Name: Mamta Paredes  MRN: [id number]  Department: [address]  Room: [address] [place] [address]  Today's Date: 3/10/2025  Time Calculation  Start Time: 1305  Stop Time: 1330  Time Calculation (min): 25 min         Assessment/Plan   PT Assessment  PT Assessment Results: Decreased strength, Decreased range of motion, Decreased endurance, Impaired balance, Decreased mobility, Decreased coordination, Decreased cognition, Impaired judgement, Decreased safety awareness, Impaired vision, Impaired sensation, Obesity, Impaired tone, Decreased skin integrity, Pain  Rehab Prognosis: Good  Barriers to Discharge Home: Physical needs  Physical Needs: 24hr mobility assistance needed, 24hr ADL assistance needed, High falls risk due to function or environment  Evaluation/Treatment Tolerance: Patient tolerated treatment well  Medical Staff Made Aware: Yes  Strengths: Attitude of self  Barriers to Participation: Comorbidities  End of Session Communication: Bedside nurse  Assessment Comment: The patient is progressing well towards functional goals. Pt requires min to modA for transfers and ambulation with RW; pt is motivated for participation in mobility; pt would benefit from skilled therapy services to address functional deficits.  End of Session Patient Position: Up in chair, Alarm off, not on at start of session (nsg aware)  PT Plan  Inpatient/Swing Bed or Outpatient: Inpatient  PT Plan  Treatment/Interventions: Bed mobility, Gait training, Transfer training, Balance training, Neuromuscular re-education, Strengthening, Endurance training, Range of motion, Therapeutic exercise, Therapeutic activity, Home exercise program  PT Plan: Ongoing PT  PT Frequency: 4 times per week  PT Discharge Recommendations: Moderate intensity level of continued care  Equipment Recommended upon Discharge: Wheeled walker  PT Recommended Transfer Status: Assist x1, Assist x2  PT - OK to Discharge: Yes      General  Visit Information:   PT  Visit  PT Received On: 03/10/25  Response to Previous Treatment: Patient with no complaints from previous session.  General  Family/Caregiver Present: No  Prior to Session Communication: Bedside nurse  Patient Position Received: Bed, 3 rail up, Alarm off, not on at start of session  Preferred Learning Style: verbal, visual  General Comment: Patient cleared for therapy follow-up by page. Pt presented supine in bed and agreeable to mobility.    Subjective   Precautions:  Precautions  Hearing/Visual Limitations: glasses prn  LE Weight Bearing Status: Weight Bearing as Tolerated  Medical Precautions: Fall precautions  Precautions Comment: post-op shoe to Lt foot      Vital Signs Comment: HR: 76 bpm     Objective   Pain:  Pain Assessment  Pain Assessment: 0-10  0-10 (Numeric) Pain Score: 0 - No pain  Cognition:  Cognition  Overall Cognitive Status: Within Functional Limits  Orientation Level: Oriented X4  Coordination:  Coordination Comment: increased time and effort for mobility  Postural Control:  Postural Control  Posture Comment: forward head; rounded shoulders; MO  Dynamic Standing Balance  Dynamic Standing-Balance Support: Bilateral upper extremity supported (RW)  Dynamic Standing-Level of Assistance: Minimum assistance  Dynamic Standing-Comments: widened MELISSA; forward flexed posture  Extremity/Trunk Assessments:  RLE   RLE :  (grossly 4-/5)  LLE   LLE :  (grossly 3-3+/5)  Activity Tolerance:  Activity Tolerance  Endurance: Tolerates 10 - 20 min exercise with multiple rests  Activity Tolerance Comments: visible SOB; pt on room air  Rate of Perceived Exertion (RPE): 5/10  Treatments:  Bed Mobility  Bed Mobility: Yes  Bed Mobility 1  Bed Mobility 1: Supine to sitting  Level of Assistance 1: Moderate assistance  Bed Mobility Comments 1: assist with moving LLE to EOB; scooting hips with use of draw sheet; pt utilizing elevated HOB and bed rail    Ambulation/Gait Training  Ambulation/Gait Training  Performed: Yes  Ambulation/Gait Training 1  Surface 1: Level tile  Device 1: Rolling walker  Assistance 1: Minimum assistance  Quality of Gait 1: Wide base of support, Diminished heel strike, Ataxic  Comments/Distance (ft) 1: 12ftx2 with James and RW; Lt toe drag with wide MELISSA, shuffled and ataxic-like steps  Transfers  Transfer: Yes  Transfer 1  Transfer From 1: Bed to, Stand to  Transfer to 1: Stand, Toilet  Technique 1: Sit to stand, Stand to sit  Transfer Device 1: Walker  Transfer Level of Assistance 1: Minimum assistance  Trials/Comments 1: VCs for safe sequencing  Transfers 2  Transfer From 2: Toilet to, Stand to  Transfer to 2: Stand, Chair with arms  Technique 2: Sit to stand, Stand to sit  Transfer Device 2: Walker  Transfer Level of Assistance 2: Minimum assistance    Outcome Measures:  [facility] Basic Mobility  Turning from your back to your side while in a flat bed without using bedrails: A little  Moving from lying on your back to sitting on the side of a flat bed without using bedrails: A lot  Moving to and from bed to chair (including a wheelchair): A lot  Standing up from a chair using your arms (e.g. wheelchair or bedside chair): A lot  To walk in hospital room: A little  Climbing 3-5 steps with railing: Total  Basic Mobility - Total Score: 13    Education Documentation  Mobility Training, taught by Joan Spann PT at 3/10/2025  2:17 PM.  Learner: Patient  Readiness: Acceptance  Method: Explanation  Response: Verbalizes Understanding  Comment: reviewed PT POC    Education Comments  No comments found.      Encounter Problems       Encounter Problems (Active)       PT Problem       Strength (Progressing)       Start:  03/08/25    Expected End:  04/08/25       The patient will demonstrate an overall strength of >4/5 in BLE to assist with completion of functional mobility.           Functional Mobility (Progressing)       Start:  03/08/25    Expected End:  04/08/25       The patient will complete  functional mobility (bed mobility, transfers, etc.) at a distant supervision level with LRAD by DC.           Ambulation (Progressing)       Start:  03/08/25    Expected End:  04/08/25       The patient will be able to ambulate at a close supervision level for 30ftx1 with RW.          Balance (Progressing)       Start:  03/08/25    Expected End:  04/08/25       The patient will demonstrate good dynamic standing balance during activity with LRAD.             Pain - Adult

## 2025-03-10 NOTE — PROGRESS NOTES
Mamta Paredes is a 54 y.o. female on day 0 of admission presenting with Fall, initial encounter.    Subjective   Patient seen and examined.  Resting in bed in no acute distress.  Awake alert oriented x 3.  Generalized weakness.  No focal weakness.  Constipated.  No other symptoms.  Discussed discharge plan to skilled nursing rehabilitation facility, requests facility close to family.    Objective     Physical Exam  Vitals and nursing note reviewed.   Constitutional:       General: She is not in acute distress.     Appearance: Normal appearance. She is obese. She is not ill-appearing, toxic-appearing or diaphoretic.   HENT:      Head: Normocephalic and atraumatic.      Right Ear: External ear normal.      Left Ear: External ear normal.      Nose: Nose normal.      Mouth/Throat:      Mouth: Mucous membranes are moist.      Pharynx: Oropharynx is clear.   Eyes:      Extraocular Movements: Extraocular movements intact.      Conjunctiva/sclera: Conjunctivae normal.      Pupils: Pupils are equal, round, and reactive to light.   Cardiovascular:      Rate and Rhythm: Normal rate and regular rhythm.      Pulses: Normal pulses.      Heart sounds: Normal heart sounds. No murmur heard.  Pulmonary:      Effort: Pulmonary effort is normal. No respiratory distress.      Breath sounds: Normal breath sounds. No wheezing, rhonchi or rales.   Abdominal:      General: Bowel sounds are normal. There is no distension.      Palpations: Abdomen is soft.      Tenderness: There is no abdominal tenderness.   Genitourinary:     Comments: Deferred.  Musculoskeletal:         General: No swelling or tenderness. Normal range of motion.      Cervical back: Normal range of motion and neck supple.      Right lower leg: No edema.      Left lower leg: No edema.   Skin:     General: Skin is warm and dry.      Capillary Refill: Capillary refill takes less than 2 seconds.   Neurological:      General: No focal deficit present.      Mental Status: She  "is alert and oriented to person, place, and time.      Comments: Generalized weakness. No focal weakness. Gait deferred.   Psychiatric:         Mood and Affect: Mood normal.         Behavior: Behavior normal.       Last Recorded Vitals  Blood pressure 102/59, pulse 58, temperature 37 °C (98.6 °F), temperature source Oral, resp. rate 18, height 1.6 m (5' 3\"), weight 122 kg (268 lb), SpO2 95%.    Intake/Output last 3 Shifts:  I/O last 3 completed shifts:  In: 240 (2 mL/kg) [P.O.:240]  Out: 800 (6.6 mL/kg) [Urine:800 (0.2 mL/kg/hr)]  Weight: 121.6 kg     Relevant   Results for orders placed or performed during the hospital encounter of 03/07/25 (from the past 24 hours)   POCT GLUCOSE   Result Value Ref Range    POCT Glucose 162 (H) 74 - 99 mg/dL   POCT GLUCOSE   Result Value Ref Range    POCT Glucose 91 74 - 99 mg/dL   POCT GLUCOSE   Result Value Ref Range    POCT Glucose 139 (H) 74 - 99 mg/dL   POCT GLUCOSE   Result Value Ref Range    POCT Glucose 123 (H) 74 - 99 mg/dL     No results found for the last 90 days.    No results found.    Scheduled medications  ascorbic acid, 500 mg, oral, Daily  aspirin, 325 mg, oral, Daily  atorvastatin, 80 mg, oral, Nightly  baclofen, 5 mg, oral, TID  busPIRone, 10 mg, oral, BID  carvedilol, 12.5 mg, oral, BID  cholecalciferol, 1,000 Units, oral, Daily  docusate sodium, 100 mg, oral, BID  DULoxetine, 60 mg, oral, Nightly  ezetimibe, 10 mg, oral, Daily  folic acid, 800 mcg, oral, Daily  heparin (porcine), 7,500 Units, subcutaneous, q8h OLIVIA  hydrocortisone, 1 Application, Topical, BID  lamoTRIgine, 100 mg, oral, Nightly  lisinopril, 10 mg, oral, Nightly  melatonin, 6 mg, oral, Nightly  pyridoxine, 200 mg, oral, Nightly  pyridoxine, 300 mg, oral, Daily  spironolactone, 12.5 mg, oral, Daily      Continuous medications     PRN medications  PRN medications: acetaminophen **OR** acetaminophen **OR** acetaminophen, albuterol, benzocaine-menthol, calcium carbonate, dextromethorphan-guaifenesin, " fluticasone, guaiFENesin, hydrOXYzine HCL, polyethylene glycol    ASSESSMENT:  Fall  Generalized weakness  Morbid obesity  Hypertension  Hyperlipidemia  Chronic systolic congestive hear failure  History of CVA with left hemiparesis  Depression  Anxiety  Constipation    PLAN:  Colace 100 mg twice daily.  Increase activity and fluid intake.  Avoid constipation.  PT/OT.  Fall precautions.  Up with assistance only.  Bed and chair alarm at all times.  Pressure ulcer prevention measures.  Turn and reposition every 2 hours and as needed.  Heels off bed.  Offloading.  DVT prophylaxis.  Heparin subcutaneous.  GI prophylaxis.  Supportive  care.  Patient reassured.  PT/OT recommend moderate intensity level of continued care.  Case management following for discharge planning.  Discharge plan to skilled nursing rehabilitation facility.  Awaiting discharge arrangements.  Discussed with Dr. Ling.  Okay to discharge when arrangements are made by case management.      Mamta Alcantar, NELLY-CNP

## 2025-03-10 NOTE — CARE PLAN
The patient's goals for the shift include      The clinical goals for the shift include maintain safety, increase mobility    Problem: Pain - Adult  Goal: Verbalizes/displays adequate comfort level or baseline comfort level  Outcome: Progressing  Flowsheets (Taken 3/10/2025 1825)  Verbalizes/displays adequate comfort level or baseline comfort level:   Encourage patient to monitor pain and request assistance   Assess pain using appropriate pain scale   Administer analgesics based on type and severity of pain and evaluate response   Implement non-pharmacological measures as appropriate and evaluate response   Consider cultural and social influences on pain and pain management   Notify Licensed Independent Practitioner if interventions unsuccessful or patient reports new pain     Problem: Safety - Adult  Goal: Free from fall injury  Outcome: Progressing  Flowsheets (Taken 3/10/2025 1825)  Free from fall injury:   Instruct family/caregiver on patient safety   Based on caregiver fall risk screen, instruct family/caregiver to ask for assistance with transferring infant if caregiver noted to have fall risk factors     Problem: Discharge Planning  Goal: Discharge to home or other facility with appropriate resources  Outcome: Progressing  Flowsheets (Taken 3/10/2025 1825)  Discharge to home or other facility with appropriate resources:   Identify barriers to discharge with patient and caregiver   Arrange for needed discharge resources and transportation as appropriate   Identify discharge learning needs (meds, wound care, etc)   Refer to discharge planning if patient needs post-hospital services based on physician order or complex needs related to functional status, cognitive ability or social support system     Problem: Chronic Conditions and Co-morbidities  Goal: Patient's chronic conditions and co-morbidity symptoms are monitored and maintained or improved  Outcome: Progressing  Flowsheets (Taken 3/10/2025 1825)  Care  Plan - Patient's Chronic Conditions and Co-Morbidity Symptoms are Monitored and Maintained or Improved:   Monitor and assess patient's chronic conditions and comorbid symptoms for stability, deterioration, or improvement   Collaborate with multidisciplinary team to address chronic and comorbid conditions and prevent exacerbation or deterioration   Update acute care plan with appropriate goals if chronic or comorbid symptoms are exacerbated and prevent overall improvement and discharge     Problem: Nutrition  Goal: Nutrient intake appropriate for maintaining nutritional needs  Outcome: Progressing     Problem: Skin  Goal: Decreased wound size/increased tissue granulation at next dressing change  Outcome: Progressing  Flowsheets (Taken 3/8/2025 0945 by Keyona Scott RN)  Decreased wound size/increased tissue granulation at next dressing change: Promote sleep for wound healing  Goal: Participates in plan/prevention/treatment measures  Outcome: Progressing  Goal: Prevent/manage excess moisture  Outcome: Progressing  Goal: Prevent/minimize sheer/friction injuries  Outcome: Progressing  Flowsheets (Taken 3/8/2025 0935 by Keyona Scott RN)  Prevent/minimize sheer/friction injuries:   Complete micro-shifts as needed if patient unable. Adjust patient position to relieve pressure points, not a full turn   Use pull sheet  Goal: Promote/optimize nutrition  Outcome: Progressing  Goal: Promote skin healing  Outcome: Progressing  Flowsheets (Taken 3/9/2025 1049 by Keyona Scott RN)  Promote skin healing:   Assess skin/pad under line(s)/device(s)   Protective dressings over bony prominences   Rotate device position/do not position patient on device     Problem: Bathing  Goal: LTG - Patient will utilize adaptive techniques to bathe body SUP  Outcome: Progressing     Problem: Dressings Lower Extremities  Goal: LTG - Patient will utilize adaptive techniques/equipment to dress lower body SUP  Outcome: Progressing     Problem: Dressing  Upper Extremities  Goal: LTG - Patient will complete upper body dressing Independent  Outcome: Progressing     Problem: Grooming  Goal: LTG - Patient will complete daily grooming tasks SUP standing sinkside  Outcome: Progressing     Problem: Instrumental Activities of Daily Living  Goal: LTG - Patient will complete simulated simple IADL to promote independence with meal prep tasks  Outcome: Progressing     Problem: Toileting  Goal: LTG - Patient will complete daily toileting tasks SUP  Outcome: Progressing     Problem: Fall/Injury  Goal: Not fall by end of shift  Outcome: Progressing  Goal: Be free from injury by end of the shift  Outcome: Progressing  Goal: Verbalize understanding of personal risk factors for fall in the hospital  Outcome: Progressing  Goal: Verbalize understanding of risk factor reduction measures to prevent injury from fall in the home  Outcome: Progressing  Goal: Use assistive devices by end of the shift  Outcome: Progressing  Goal: Pace activities to prevent fatigue by end of the shift  Outcome: Progressing     Problem: Pain  Goal: Takes deep breaths with improved pain control throughout the shift  Outcome: Progressing  Goal: Turns in bed with improved pain control throughout the shift  Outcome: Progressing  Goal: Walks with improved pain control throughout the shift  Outcome: Progressing  Goal: Performs ADL's with improved pain control throughout shift  Outcome: Progressing  Goal: Participates in PT with improved pain control throughout the shift  Outcome: Progressing  Goal: Free from opioid side effects throughout the shift  Outcome: Progressing  Goal: Free from acute confusion related to pain meds throughout the shift  Outcome: Progressing

## 2025-03-10 NOTE — PROGRESS NOTES
Occupational Therapy    OT Treatment    Patient Name: Mamta Paredes  MRN: [id number]  Department: [address]  Room: [address] [place] [address]  Today's Date: 3/10/2025  Time Calculation  Start Time: 1357  Stop Time: 1414  Time Calculation (min): 17 min        Assessment:  OT Assessment: Tolerated session fairly, demonstrating progression towards POC with increased cused required for safety awareness with pt demonstrating LOB x1. Limtied functional tolerance this date following fatigue from PT session.  Pt would benefit from continued skilled OT services to improve strength, balance, and functional tolerance to increase independence with ADL tasks  Barriers to Discharge Home: Caregiver assistance  End of Session Communication: Bedside nurse  End of Session Patient Position: Up in chair, Alarm off, not on at start of session (RN awre with call light in reach)  OT Assessment Results: Decreased ADL status, Decreased safe judgment during ADL, Decreased endurance, Decreased functional mobility  Plan:  Treatment Interventions: ADL retraining, Functional transfer training, Endurance training, Patient/family training, Equipment evaluation/education, Neuromuscular reeducation, Compensatory technique education  OT Frequency: 3 times per week  OT Discharge Recommendations: Moderate intensity level of continued care  OT Recommended Transfer Status: Minimal assist, Assist of 1  OT - OK to Discharge: Yes  Treatment Interventions: ADL retraining, Functional transfer training, Endurance training, Patient/family training, Equipment evaluation/education, Neuromuscular reeducation, Compensatory technique education    Subjective   Previous Visit Info:  OT Last Visit  OT Received On: 03/10/25  General:  General  Prior to Session Communication: Bedside nurse  Patient Position Received: Up in chair, Alarm off, not on at start of session  General Comment: Cleared for therapy per RN. Pt seated in chair upon arrival and agreeable to tx-fatigue following PT  session  Precautions:  Hearing/Visual Limitations: glasses prn  LE Weight Bearing Status: Weight Bearing as Tolerated (LLE in surgical shoe)  Medical Precautions: Fall precautions  Precautions Comment: post-op shoe to Lt foot, very loose fitting upon arrival requiring assist to don properly  Pain:  Pain Assessment  Pain Assessment: 0-10  0-10 (Numeric) Pain Score: 0 - No pain    Objective    Cognition:  Cognition  Orientation Level: Oriented X4  Safety/Judgement: Exceptions to WFL  Insight: Mild  Impulsive: Mildly  Coordination:  Movements are Fluid and Coordinated: No  Upper Body Coordination: decreased LUE strength + accuracy of movements  Activities of Daily Living:    Grooming  Grooming Comments: completed earlier this date    LE Dressing  LE Dressing:  (pt expressing she utilizes AE for all LB dressing  + bathing tasks at home. Pt donned R tennis shoe during session prior to OT)    Bed Mobility/Transfers:    Transfers  Transfer: Yes  Transfer 1  Technique 1: Sit to stand, Stand to sit  Transfer Device 1: Walker  Transfer Level of Assistance 1: Minimum assistance, Minimal verbal cues  Trials/Comments 1: assist for trunk elevation with cues for proper hand placement and eccentric lowering, demonstrating poor carryover of cues with decreased control in sitting    Functional Mobility:  Functional Mobility  Functional Mobility Performed: Yes  Functional Mobility 1  Device 1: Rolling walker  Assistance 1: Minimum assistance, Moderate verbal cues  Comments 1: functional mobility short household distance at FWW with cues for pacing for increased safety awareness + postural alignment with pt demonstrating LOB x1 during directional change with assist to correct; fair- balance throughout session     Standing Balance:  Static Standing Balance  Static Standing-Level of Assistance: Minimum assistance  Static Standing-Comment/Number of Minutes: fair- balance during functional mobility task  Other Activity:  Other Activity  Performed: Yes  Other Activity 1: educated on importance of safety + use of energy conservation techniques with pt expressing understanding    Outcome Measures:[facility] Daily Activity  Putting on and taking off regular lower body clothing: A lot  Bathing (including washing, rinsing, drying): A lot  Putting on and taking off regular upper body clothing: A little  Toileting, which includes using toilet, bedpan or urinal: A little  Taking care of personal grooming such as brushing teeth: A little  Eating Meals: A little  Daily Activity - Total Score: 16    Education Documentation  Handouts, taught by PRISCILLA Mcconnell at 3/10/2025  3:05 PM.  Learner: Patient  Readiness: Acceptance  Method: Explanation  Response: Verbalizes Understanding, Needs Reinforcement  Comment: educated on importance of safety during functional tasks    Body Mechanics, taught by PRISCILLA Mcconnell at 3/10/2025  3:05 PM.  Learner: Patient  Readiness: Acceptance  Method: Explanation  Response: Verbalizes Understanding, Needs Reinforcement  Comment: educated on importance of safety during functional tasks    ADL Training, taught by PRISCILLA Mcconnell at 3/10/2025  3:05 PM.  Learner: Patient  Readiness: Acceptance  Method: Explanation  Response: Verbalizes Understanding, Needs Reinforcement  Comment: educated on importance of safety during functional tasks    Education Comments  No comments found.        Problem: Functional Balance  Goal: LTG - Patient will maintain standing balance SUP to allow for completion of daily activities  Outcome: Progressing     Problem: Bathing  Goal: LTG - Patient will utilize adaptive techniques to bathe body SUP  Outcome: Progressing     Problem: Dressings Lower Extremities  Goal: LTG - Patient will utilize adaptive techniques/equipment to dress lower body SUP  Outcome: Progressing     Problem: Dressing Upper Extremities  Goal: LTG - Patient will complete upper body dressing  Independent  Outcome: Progressing     Problem: Grooming  Goal: LTG - Patient will complete daily grooming tasks SUP standing sinkside  Outcome: Progressing     Problem: Instrumental Activities of Daily Living  Goal: LTG - Patient will complete simulated simple IADL to promote independence with meal prep tasks  Outcome: Progressing     Problem: Toileting  Goal: LTG - Patient will complete daily toileting tasks SUP  Outcome: Progressing

## 2025-03-10 NOTE — PROGRESS NOTES
Spiritual Care Visit  Spiritual Care Request    Reason for Visit:  Routine Visit: Introduction     Request Received From:       Focus of Care:  Visited With: Patient not available         Refer to :          Spiritual Care Assessment    Spiritual Assessment:                      Care Provided:       Sense of Community and or Yazidi Affiliation:  Bahai         Addressed Needs/Concerns and/or Jacklyn Through:          Outcome:        Advance Directives:         Spiritual Care Annotation    Annotation:  Carol was not in her room when I tried to see her today.  Ramirez Allen

## 2025-03-11 LAB
ANION GAP SERPL CALCULATED.3IONS-SCNC: 12 MMOL/L (ref 10–20)
BUN SERPL-MCNC: 16 MG/DL (ref 6–23)
CALCIUM SERPL-MCNC: 9.1 MG/DL (ref 8.6–10.3)
CHLORIDE SERPL-SCNC: 102 MMOL/L (ref 98–107)
CO2 SERPL-SCNC: 28 MMOL/L (ref 21–32)
CREAT SERPL-MCNC: 0.82 MG/DL (ref 0.5–1.05)
EGFRCR SERPLBLD CKD-EPI 2021: 86 ML/MIN/1.73M*2
ERYTHROCYTE [DISTWIDTH] IN BLOOD BY AUTOMATED COUNT: 15.4 % (ref 11.5–14.5)
GLUCOSE SERPL-MCNC: 110 MG/DL (ref 74–99)
HCT VFR BLD AUTO: 40.6 % (ref 36–46)
HGB BLD-MCNC: 13.2 G/DL (ref 12–16)
MCH RBC QN AUTO: 30 PG (ref 26–34)
MCHC RBC AUTO-ENTMCNC: 32.5 G/DL (ref 32–36)
MCV RBC AUTO: 92 FL (ref 80–100)
NRBC BLD-RTO: 0 /100 WBCS (ref 0–0)
PLATELET # BLD AUTO: 300 X10*3/UL (ref 150–450)
POTASSIUM SERPL-SCNC: 3.8 MMOL/L (ref 3.5–5.3)
RBC # BLD AUTO: 4.4 X10*6/UL (ref 4–5.2)
SODIUM SERPL-SCNC: 138 MMOL/L (ref 136–145)
WBC # BLD AUTO: 9.5 X10*3/UL (ref 4.4–11.3)

## 2025-03-11 PROCEDURE — 85027 COMPLETE CBC AUTOMATED: CPT | Performed by: NURSE PRACTITIONER

## 2025-03-11 PROCEDURE — 2500000002 HC RX 250 W HCPCS SELF ADMINISTERED DRUGS (ALT 637 FOR MEDICARE OP, ALT 636 FOR OP/ED): Performed by: INTERNAL MEDICINE

## 2025-03-11 PROCEDURE — 97535 SELF CARE MNGMENT TRAINING: CPT | Mod: GO,CO

## 2025-03-11 PROCEDURE — 97530 THERAPEUTIC ACTIVITIES: CPT | Mod: GO,CO

## 2025-03-11 PROCEDURE — 2500000001 HC RX 250 WO HCPCS SELF ADMINISTERED DRUGS (ALT 637 FOR MEDICARE OP): Performed by: INTERNAL MEDICINE

## 2025-03-11 PROCEDURE — 80048 BASIC METABOLIC PNL TOTAL CA: CPT | Performed by: NURSE PRACTITIONER

## 2025-03-11 PROCEDURE — 96372 THER/PROPH/DIAG INJ SC/IM: CPT | Performed by: INTERNAL MEDICINE

## 2025-03-11 PROCEDURE — 2500000004 HC RX 250 GENERAL PHARMACY W/ HCPCS (ALT 636 FOR OP/ED): Performed by: INTERNAL MEDICINE

## 2025-03-11 PROCEDURE — 2500000005 HC RX 250 GENERAL PHARMACY W/O HCPCS: Performed by: INTERNAL MEDICINE

## 2025-03-11 PROCEDURE — 97116 GAIT TRAINING THERAPY: CPT | Mod: GP

## 2025-03-11 PROCEDURE — 2500000001 HC RX 250 WO HCPCS SELF ADMINISTERED DRUGS (ALT 637 FOR MEDICARE OP): Performed by: NURSE PRACTITIONER

## 2025-03-11 PROCEDURE — 36415 COLL VENOUS BLD VENIPUNCTURE: CPT | Performed by: NURSE PRACTITIONER

## 2025-03-11 PROCEDURE — G0378 HOSPITAL OBSERVATION PER HR: HCPCS

## 2025-03-11 PROCEDURE — 97530 THERAPEUTIC ACTIVITIES: CPT | Mod: GP

## 2025-03-11 RX ORDER — DOCUSATE SODIUM 100 MG/1
100 CAPSULE, LIQUID FILLED ORAL 2 TIMES DAILY
Start: 2025-03-11

## 2025-03-11 RX ADMIN — Medication 6 MG: at 20:10

## 2025-03-11 RX ADMIN — PYRIDOXINE HCL TAB 50 MG 200 MG: 50 TAB at 20:09

## 2025-03-11 RX ADMIN — ATORVASTATIN CALCIUM 80 MG: 80 TABLET, FILM COATED ORAL at 20:10

## 2025-03-11 RX ADMIN — BACLOFEN 5 MG: 10 TABLET ORAL at 14:38

## 2025-03-11 RX ADMIN — CARVEDILOL 12.5 MG: 12.5 TABLET, FILM COATED ORAL at 20:10

## 2025-03-11 RX ADMIN — FOLIC ACID TAB 400 MCG 800 MCG: 400 TAB at 08:53

## 2025-03-11 RX ADMIN — OXYCODONE HYDROCHLORIDE AND ACETAMINOPHEN 500 MG: 500 TABLET ORAL at 08:54

## 2025-03-11 RX ADMIN — LISINOPRIL 10 MG: 10 TABLET ORAL at 20:10

## 2025-03-11 RX ADMIN — SPIRONOLACTONE 12.5 MG: 25 TABLET ORAL at 08:53

## 2025-03-11 RX ADMIN — Medication 1000 UNITS: at 06:22

## 2025-03-11 RX ADMIN — BUSPIRONE HYDROCHLORIDE 10 MG: 10 TABLET ORAL at 08:54

## 2025-03-11 RX ADMIN — DULOXETINE HYDROCHLORIDE 60 MG: 60 CAPSULE, DELAYED RELEASE ORAL at 20:10

## 2025-03-11 RX ADMIN — ACETAMINOPHEN 650 MG: 325 TABLET, FILM COATED ORAL at 09:35

## 2025-03-11 RX ADMIN — PYRIDOXINE HCL TAB 50 MG 300 MG: 50 TAB at 08:53

## 2025-03-11 RX ADMIN — BACLOFEN 5 MG: 10 TABLET ORAL at 20:10

## 2025-03-11 RX ADMIN — BUSPIRONE HYDROCHLORIDE 10 MG: 10 TABLET ORAL at 20:10

## 2025-03-11 RX ADMIN — BACLOFEN 5 MG: 10 TABLET ORAL at 08:54

## 2025-03-11 RX ADMIN — CARVEDILOL 12.5 MG: 12.5 TABLET, FILM COATED ORAL at 08:54

## 2025-03-11 RX ADMIN — HEPARIN SODIUM 7500 UNITS: 5000 INJECTION, SOLUTION INTRAVENOUS; SUBCUTANEOUS at 06:22

## 2025-03-11 RX ADMIN — EZETIMIBE 10 MG: 10 TABLET ORAL at 06:22

## 2025-03-11 RX ADMIN — HYDROXYZINE HYDROCHLORIDE 25 MG: 25 TABLET, FILM COATED ORAL at 14:38

## 2025-03-11 RX ADMIN — ASPIRIN 325 MG: 325 TABLET ORAL at 08:54

## 2025-03-11 RX ADMIN — LAMOTRIGINE 100 MG: 100 TABLET ORAL at 20:10

## 2025-03-11 RX ADMIN — DOCUSATE SODIUM 100 MG: 100 CAPSULE, LIQUID FILLED ORAL at 20:10

## 2025-03-11 RX ADMIN — HEPARIN SODIUM 7500 UNITS: 5000 INJECTION, SOLUTION INTRAVENOUS; SUBCUTANEOUS at 21:25

## 2025-03-11 RX ADMIN — HYDROCORTISONE 1 APPLICATION: 25 LOTION TOPICAL at 20:09

## 2025-03-11 RX ADMIN — DOCUSATE SODIUM 100 MG: 100 CAPSULE, LIQUID FILLED ORAL at 08:54

## 2025-03-11 RX ADMIN — HEPARIN SODIUM 7500 UNITS: 5000 INJECTION, SOLUTION INTRAVENOUS; SUBCUTANEOUS at 14:38

## 2025-03-11 ASSESSMENT — COGNITIVE AND FUNCTIONAL STATUS - GENERAL
DAILY ACTIVITIY SCORE: 20
CLIMB 3 TO 5 STEPS WITH RAILING: A LOT
MOVING TO AND FROM BED TO CHAIR: A LITTLE
CLIMB 3 TO 5 STEPS WITH RAILING: A LOT
TOILETING: A LITTLE
STANDING UP FROM CHAIR USING ARMS: A LITTLE
TURNING FROM BACK TO SIDE WHILE IN FLAT BAD: A LOT
TURNING FROM BACK TO SIDE WHILE IN FLAT BAD: A LITTLE
DRESSING REGULAR UPPER BODY CLOTHING: A LITTLE
EATING MEALS: A LITTLE
PERSONAL GROOMING: A LITTLE
MOBILITY SCORE: 18
DRESSING REGULAR LOWER BODY CLOTHING: A LOT
MOVING FROM LYING ON BACK TO SITTING ON SIDE OF FLAT BED WITH BEDRAILS: A LITTLE
MOBILITY SCORE: 15
TOILETING: A LITTLE
STANDING UP FROM CHAIR USING ARMS: A LITTLE
HELP NEEDED FOR BATHING: A LITTLE
DRESSING REGULAR UPPER BODY CLOTHING: A LITTLE
MOVING FROM LYING ON BACK TO SITTING ON SIDE OF FLAT BED WITH BEDRAILS: A LITTLE
DAILY ACTIVITIY SCORE: 20
DRESSING REGULAR LOWER BODY CLOTHING: A LITTLE
WALKING IN HOSPITAL ROOM: A LITTLE
TOILETING: A LITTLE
TURNING FROM BACK TO SIDE WHILE IN FLAT BAD: A LITTLE
DRESSING REGULAR UPPER BODY CLOTHING: A LITTLE
MOVING TO AND FROM BED TO CHAIR: A LITTLE
DRESSING REGULAR LOWER BODY CLOTHING: A LITTLE
HELP NEEDED FOR BATHING: A LITTLE
MOVING TO AND FROM BED TO CHAIR: A LITTLE
CLIMB 3 TO 5 STEPS WITH RAILING: TOTAL
STANDING UP FROM CHAIR USING ARMS: A LITTLE
MOBILITY SCORE: 17
WALKING IN HOSPITAL ROOM: A LITTLE
DAILY ACTIVITIY SCORE: 16
HELP NEEDED FOR BATHING: A LOT
WALKING IN HOSPITAL ROOM: A LITTLE

## 2025-03-11 ASSESSMENT — PAIN DESCRIPTION - ORIENTATION: ORIENTATION: MID

## 2025-03-11 ASSESSMENT — PAIN DESCRIPTION - LOCATION: LOCATION: HEAD

## 2025-03-11 ASSESSMENT — PAIN SCALES - GENERAL
PAINLEVEL_OUTOF10: 0 - NO PAIN
PAINLEVEL_OUTOF10: 3
PAINLEVEL_OUTOF10: 0 - NO PAIN
PAINLEVEL_OUTOF10: 0 - NO PAIN
PAINLEVEL_OUTOF10: 3

## 2025-03-11 ASSESSMENT — PAIN - FUNCTIONAL ASSESSMENT
PAIN_FUNCTIONAL_ASSESSMENT: 0-10

## 2025-03-11 ASSESSMENT — ACTIVITIES OF DAILY LIVING (ADL): HOME_MANAGEMENT_TIME_ENTRY: 13

## 2025-03-11 ASSESSMENT — PAIN DESCRIPTION - DESCRIPTORS: DESCRIPTORS: ACHING

## 2025-03-11 NOTE — PROGRESS NOTES
Spiritual Care Visit  Spiritual Care Request    Reason for Visit:  Routine Visit: Introduction     Request Received From:       Focus of Care:  Visited With: Patient         Refer to :          Spiritual Care Assessment    Spiritual Assessment:                      Care Provided:  Intended Effects: Build relationship of care and support, Demonstrate caring and concern, Promote sense of peace    Sense of Community and or Taoist Affiliation:  Anabaptist   Values/Beliefs  Spiritual Requests During Hospitalization: Carol asked to be anointed and to have Communion today.     Addressed Needs/Concerns and/or Jacklyn Through:     Sacramental Encounters  Communion: Patient wants communion  Communion Given Indicator: Yes  Sacrament of Sick-Anointing: Anointed    Outcome:        Advance Directives:         Spiritual Care Annotation    Annotation:  Carol asked to be anointed and to have Communion today.  Ramirez Allen

## 2025-03-11 NOTE — PROGRESS NOTES
Physical Therapy    Physical Therapy Treatment    Patient Name: Mamta Pardees  MRN: [id number]  Department: [address]  Room: [address] [place] [address]  Today's Date: 3/11/2025  Time Calculation  Start Time: 0831  Stop Time: 0855  Time Calculation (min): 24 min         Assessment/Plan   PT Assessment  PT Assessment Results: Decreased strength, Decreased range of motion, Decreased endurance, Impaired balance, Decreased mobility, Decreased coordination, Decreased cognition, Impaired judgement, Decreased safety awareness, Impaired vision, Impaired sensation, Obesity, Impaired tone, Decreased skin integrity, Pain  Rehab Prognosis: Good  Barriers to Discharge Home: Physical needs  Physical Needs: 24hr mobility assistance needed, 24hr ADL assistance needed, High falls risk due to function or environment  Evaluation/Treatment Tolerance: Patient tolerated treatment well  Medical Staff Made Aware: Yes  Strengths: Attitude of self  Barriers to Participation: Comorbidities  End of Session Communication: Bedside nurse  Assessment Comment: The patient is progressing well towards functional goals. Pt requires min to modA for transfers and ambulation with RW; pt is motivated for participation in mobility; pt would benefit from skilled therapy services to address functional deficits.  End of Session Patient Position: Up in chair, Alarm off, not on at start of session  PT Plan  Inpatient/Swing Bed or Outpatient: Inpatient  PT Plan  Treatment/Interventions: Bed mobility, Gait training, Transfer training, Balance training, Neuromuscular re-education, Strengthening, Endurance training, Range of motion, Therapeutic exercise, Therapeutic activity, Home exercise program  PT Plan: Ongoing PT  PT Frequency: 4 times per week  PT Discharge Recommendations: Moderate intensity level of continued care  Equipment Recommended upon Discharge: Wheeled walker  PT Recommended Transfer Status: Assist x1, Assist x2  PT - OK to Discharge: Yes      General Visit  Information:   PT  Visit  PT Received On: 03/11/25  Response to Previous Treatment: Patient with no complaints from previous session.  General  Family/Caregiver Present: No  Prior to Session Communication: Bedside nurse  Patient Position Received: Up in chair, Alarm off, not on at start of session  Preferred Learning Style: verbal, visual  General Comment: Patient cleared for therapy follow-up by nsg. Pt presented supine in bed and agreeable to session.    Subjective   Precautions:  Precautions  Hearing/Visual Limitations: glasses prn  LE Weight Bearing Status: Weight Bearing as Tolerated  Medical Precautions: Fall precautions  Precautions Comment: post-op shoe to Lt foot     Date/Time Vitals Session Patient Position Pulse Resp SpO2 BP MAP (mmHg)    03/11/25 0824 --  --  64  18  93 %  109/66  75           Vital Signs Comment: HR: 83 bpm     Objective   Pain:  Pain Assessment  Pain Assessment: 0-10  0-10 (Numeric) Pain Score: 3  Pain Type: Acute pain  Pain Location: Head  Pain Interventions: Repositioned, Ambulation/increased activity  Response to Interventions: Content/relaxed (nsg aware)  Cognition:  Cognition  Overall Cognitive Status: Within Functional Limits  Orientation Level: Oriented X4  Insight: Mild  Impulsive: Mildly  Coordination:  Coordination Comment: increased time and effort for mobility  Postural Control:  Postural Control  Posture Comment: forward head; rounded shoulders; MO  Static Sitting Balance  Static Sitting-Balance Support: Feet supported  Static Sitting-Level of Assistance: Distant supervision  Static Sitting-Comment/Number of Minutes: EOB > 5 min  Static Standing Balance  Static Standing-Balance Support: Bilateral upper extremity supported (RW)  Static Standing-Level of Assistance: Minimum assistance  Static Standing-Comment/Number of Minutes: widened MELISSA  Dynamic Standing Balance  Dynamic Standing-Balance Support: Bilateral upper extremity supported (RW)  Dynamic Standing-Level of  Assistance: Minimum assistance  Dynamic Standing-Comments: widened MELISSA, forward flexed posture  Extremity/Trunk Assessments:  RLE   RLE :  (grossly 4-/5)  LLE   LLE :  (grossly 3-3+/5)  Activity Tolerance:  Activity Tolerance  Endurance: Tolerates 10 - 20 min exercise with multiple rests  Activity Tolerance Comments: visible SOB; pt on room air; SpO2 95%  Rate of Perceived Exertion (RPE): 5/10  Treatments:  Therapeutic Exercise  Therapeutic Exercise Performed: Yes  Therapeutic Exercise Activity 1: AP x 10reps samuel  Therapeutic Exercise Activity 2: LAQ x 10 reps samuel  Therapeutic Exercise Activity 3: Hip flex x 10 reps samuel  Therapeutic Exercise Activity 4: Hip abd/add x 10 reps samuel    Bed Mobility  Bed Mobility: Yes  Bed Mobility 1  Bed Mobility 1: Supine to sitting  Level of Assistance 1: Moderate assistance  Bed Mobility Comments 1: assist with LLE to EOB; assist with scooting hips with draw sheet; HOB elevated to 30 deg; use of bed rails    Ambulation/Gait Training  Ambulation/Gait Training Performed: Yes  Ambulation/Gait Training 1  Surface 1: Level tile  Device 1: Rolling walker  Assistance 1: Minimum assistance  Quality of Gait 1: Wide base of support, Diminished heel strike, Ataxic  Comments/Distance (ft) 1: 12ftx2 with James and RW; Lt toe drag with wide MELISSA, shuffled and ataxic-like steps  Transfers  Transfer: Yes  Transfer 1  Transfer From 1: Bed to, Stand to  Transfer to 1: Stand, Toilet  Technique 1: Sit to stand, Stand to sit  Transfer Device 1: Walker  Transfer Level of Assistance 1: Minimum assistance, Minimal verbal cues  Trials/Comments 1: assist for trunk elevation  Transfers 2  Transfer From 2: Toilet to, Stand to  Transfer to 2: Stand, Chair with arms  Technique 2: Sit to stand, Stand to sit  Transfer Device 2: Walker  Transfer Level of Assistance 2: Minimum assistance    Outcome Measures:  [facility] Basic Mobility  Turning from your back to your side while in a flat bed without using bedrails: A  little  Moving from lying on your back to sitting on the side of a flat bed without using bedrails: A lot  Moving to and from bed to chair (including a wheelchair): A little  Standing up from a chair using your arms (e.g. wheelchair or bedside chair): A little  To walk in hospital room: A little  Climbing 3-5 steps with railing: Total  Basic Mobility - Total Score: 15    Education Documentation  Mobility Training, taught by Joan Spann PT at 3/11/2025 10:22 AM.  Learner: Patient  Readiness: Acceptance  Method: Explanation  Response: Verbalizes Understanding  Comment: reviewed PT POC    Education Comments  No comments found.      Encounter Problems       Encounter Problems (Active)       PT Problem       Strength (Progressing)       Start:  03/08/25    Expected End:  04/08/25       The patient will demonstrate an overall strength of >4/5 in BLE to assist with completion of functional mobility.           Functional Mobility (Progressing)       Start:  03/08/25    Expected End:  04/08/25       The patient will complete functional mobility (bed mobility, transfers, etc.) at a distant supervision level with LRAD by DC.           Ambulation (Progressing)       Start:  03/08/25    Expected End:  04/08/25       The patient will be able to ambulate at a close supervision level for 30ftx1 with RW.          Balance (Progressing)       Start:  03/08/25    Expected End:  04/08/25       The patient will demonstrate good dynamic standing balance during activity with LRAD.             Pain - Adult

## 2025-03-11 NOTE — PROGRESS NOTES
Occupational Therapy    OT Treatment    Patient Name: Mamta Paredes  MRN: [id number]  Department: [address]  Room: [address] [place] [address]  Today's Date: 3/11/2025  Time Calculation  Start Time: 1401  Stop Time: 1425  Time Calculation (min): 24 min        Assessment:  OT Assessment: Tolerated session fairly, demonstrating progression towards POC with repetitive cues required for safety awareness d/t walker abandonment. Pt would benefit from continued skilled OT services to improve strength, balance, and functional tolerance to increase independence with ADL tasks  Barriers to Discharge Home: Caregiver assistance, Physical needs  End of Session Communication: Bedside nurse  End of Session Patient Position: Up in chair, Alarm off, not on at start of session (RN aware)  OT Assessment Results: Decreased ADL status, Decreased safe judgment during ADL, Decreased endurance, Decreased functional mobility  Plan:  Treatment Interventions: ADL retraining, Functional transfer training, Endurance training, Patient/family training, Equipment evaluation/education, Neuromuscular reeducation, Compensatory technique education  OT Frequency: 3 times per week  OT Discharge Recommendations: Moderate intensity level of continued care  OT Recommended Transfer Status: Minimal assist, Assist of 1  OT - OK to Discharge: Yes  Treatment Interventions: ADL retraining, Functional transfer training, Endurance training, Patient/family training, Equipment evaluation/education, Neuromuscular reeducation, Compensatory technique education    Subjective   Previous Visit Info:  OT Last Visit  OT Received On: 03/11/25  General:  General  Reason for Referral: 54yoF PMH CVA - L weakness, CKD, CAD, L metatarsal fx, HTN, systolic CHF, obesity, h/o COPD syndrome presenting with multiple falls at home.  Prior to Session Communication: Bedside nurse  Patient Position Received: Up in chair, Alarm off, not on at start of session  General Comment: Cleared for therapy per RN. Pt  seated in chair upon arrival and agreeable to tx  Precautions:  Hearing/Visual Limitations: glasses prn  LE Weight Bearing Status: Weight Bearing as Tolerated  Medical Precautions: Fall precautions  Precautions Comment: post-op shoe to Lt foot    Pain:  Pain Assessment  Pain Assessment: 0-10  0-10 (Numeric) Pain Score: 0 - No pain    Objective    Cognition:  Cognition  Orientation Level: Oriented X4  Attention: Exceptions to WFL (easily distracted by phone)  Insight: Mild  Impulsive: Mildly  Processing Speed: Delayed  Coordination:  Movements are Fluid and Coordinated: No  Upper Body Coordination: decreased LUE strength + accuracy of movements  Activities of Daily Living:    Grooming  Grooming Level of Assistance: Setup, Close supervision  Grooming Where Assessed: Chair  Grooming Comments: face washing, hair brushing, and oral hygiene tasks with cues for task efficiency    UE Dressing  UE Dressing Level of Assistance: Minimum assistance  UE Dressing Comments: assist to don/doff gown    Toileting  Toileting Level of Assistance: Close supervision  Where Assessed: Toilet  Toileting Comments: increased time required for toileting with pt compelting pericare hygiene in seated    Bed Mobility/Transfers:    Transfers  Transfer: Yes  Transfer 1  Technique 1: Sit to stand, Stand to sit  Transfer Device 1: Walker  Transfer Level of Assistance 1: Minimum assistance, Minimal verbal cues  Trials/Comments 1: assist for trunk elevation with cues for proper hand placement, demonstrating decreased eccentric lowering to chair    Toilet Transfers  Toilet Transfer Type: To and from  Toilet Transfer to: Standard toilet  Toilet Transfers: Supervision  Toilet Transfers Comments: cues for use of grab bar for UE support with cues for eccentric lowering, demonstrating decreased control    Functional Mobility:  Functional Mobility  Functional Mobility Performed: Yes  Functional Mobility 1  Device 1: Rolling walker  Assistance 1: Minimum  assistance, Moderate verbal cues  Comments 1: functional mobility short household distance at [facility] with cues for pacing for increased safety awareness + for walker sequencing and distancing, demonstrating poor balance with walker abandonment prior to alignment with surfaces    Standing Balance:  Static Standing Balance  Static Standing-Level of Assistance: Minimum assistance  Static Standing-Comment/Number of Minutes: fair-/poor balance during functional mobility    Outcome Measures:[facility] Daily Activity  Putting on and taking off regular lower body clothing: A lot  Bathing (including washing, rinsing, drying): A lot  Putting on and taking off regular upper body clothing: A little  Toileting, which includes using toilet, bedpan or urinal: A little  Taking care of personal grooming such as brushing teeth: A little  Eating Meals: A little  Daily Activity - Total Score: 16    Education Documentation  Handouts, taught by PRISCILLA Mcconnell at 3/11/2025  2:57 PM.  Learner: Patient  Readiness: Acceptance  Method: Explanation  Response: Verbalizes Understanding, Needs Reinforcement  Comment: repetitive education on safety awareness during functional mobility    Body Mechanics, taught by PRISCILLA Mcconnell at 3/11/2025  2:57 PM.  Learner: Patient  Readiness: Acceptance  Method: Explanation  Response: Verbalizes Understanding, Needs Reinforcement  Comment: repetitive education on safety awareness during functional mobility    ADL Training, taught by PRISCILLA Mcconnell at 3/11/2025  2:57 PM.  Learner: Patient  Readiness: Acceptance  Method: Explanation  Response: Verbalizes Understanding, Needs Reinforcement  Comment: repetitive education on safety awareness during functional mobility      Problem: Functional Balance  Goal: LTG - Patient will maintain standing balance SUP to allow for completion of daily activities  Outcome: Progressing     Problem: Bathing  Goal: LTG - Patient will utilize adaptive  techniques to bathe body SUP  Outcome: Progressing     Problem: Dressings Lower Extremities  Goal: LTG - Patient will utilize adaptive techniques/equipment to dress lower body SUP  Outcome: Progressing     Problem: Dressing Upper Extremities  Goal: LTG - Patient will complete upper body dressing Independent  Outcome: Progressing     Problem: Grooming  Goal: LTG - Patient will complete daily grooming tasks SUP standing sinkside  Outcome: Progressing     Problem: Instrumental Activities of Daily Living  Goal: LTG - Patient will complete simulated simple IADL to promote independence with meal prep tasks  Outcome: Progressing     Problem: Toileting  Goal: LTG - Patient will complete daily toileting tasks SUP  Outcome: Progressing

## 2025-03-11 NOTE — PROGRESS NOTES
Patient medically clear. [facility] and started precert on 3/10. Patient's insurance requesting updates, TCC sent to facility. Will continue to follow.      03/11/25 1253   Discharge Planning   Home or Post Acute Services Post acute facilities (Rehab/SNF/etc)   Type of Post Acute Facility Services Rehab   Expected Discharge Disposition SNF  ([facility])   Does the patient need discharge transport arranged? Yes   RoundTrip coordination needed? Yes

## 2025-03-11 NOTE — CARE PLAN
The patient's goals for the shift include      The clinical goals for the shift include maintain safety    Problem: Pain - Adult  Goal: Verbalizes/displays adequate comfort level or baseline comfort level  Outcome: Progressing     Problem: Safety - Adult  Goal: Free from fall injury  Outcome: Progressing     Problem: Discharge Planning  Goal: Discharge to home or other facility with appropriate resources  Outcome: Progressing     Problem: Chronic Conditions and Co-morbidities  Goal: Patient's chronic conditions and co-morbidity symptoms are monitored and maintained or improved  Outcome: Progressing     Problem: Nutrition  Goal: Nutrient intake appropriate for maintaining nutritional needs  Outcome: Progressing     Problem: Skin  Goal: Decreased wound size/increased tissue granulation at next dressing change  Outcome: Progressing  Flowsheets (Taken 3/11/2025 1142)  Decreased wound size/increased tissue granulation at next dressing change: Promote sleep for wound healing  Goal: Participates in plan/prevention/treatment measures  Outcome: Progressing  Flowsheets (Taken 3/11/2025 1142)  Participates in plan/prevention/treatment measures:   Elevate heels   Increase activity/out of bed for meals   Discuss with provider PT/OT consult  Goal: Prevent/manage excess moisture  Outcome: Progressing  Flowsheets (Taken 3/11/2025 1142)  Prevent/manage excess moisture:   Moisturize dry skin   Monitor for/manage infection if present  Goal: Prevent/minimize sheer/friction injuries  Outcome: Progressing  Flowsheets (Taken 3/11/2025 1142)  Prevent/minimize sheer/friction injuries:   HOB 30 degrees or less   Increase activity/out of bed for meals  Goal: Promote/optimize nutrition  Outcome: Progressing  Flowsheets (Taken 3/11/2025 1142)  Promote/optimize nutrition:   Consume > 50% meals/supplements   Monitor/record intake including meals   Offer water/supplements/favorite foods  Goal: Promote skin healing  Outcome: Progressing  Flowsheets  (Taken 3/11/2025 1142)  Promote skin healing:   Assess skin/pad under line(s)/device(s)   Protective dressings over bony prominences     Problem: Bathing  Goal: LTG - Patient will utilize adaptive techniques to bathe body SUP  Outcome: Progressing     Problem: Dressings Lower Extremities  Goal: LTG - Patient will utilize adaptive techniques/equipment to dress lower body SUP  Outcome: Progressing     Problem: Dressing Upper Extremities  Goal: LTG - Patient will complete upper body dressing Independent  Outcome: Progressing     Problem: Grooming  Goal: LTG - Patient will complete daily grooming tasks SUP standing sinkside  Outcome: Progressing     Problem: Instrumental Activities of Daily Living  Goal: LTG - Patient will complete simulated simple IADL to promote independence with meal prep tasks  Outcome: Progressing     Problem: Toileting  Goal: LTG - Patient will complete daily toileting tasks SUP  Outcome: Progressing     Problem: Fall/Injury  Goal: Not fall by end of shift  Outcome: Progressing  Goal: Be free from injury by end of the shift  Outcome: Progressing  Goal: Verbalize understanding of personal risk factors for fall in the hospital  Outcome: Progressing  Goal: Verbalize understanding of risk factor reduction measures to prevent injury from fall in the home  Outcome: Progressing  Goal: Use assistive devices by end of the shift  Outcome: Progressing  Goal: Pace activities to prevent fatigue by end of the shift  Outcome: Progressing     Problem: Pain  Goal: Takes deep breaths with improved pain control throughout the shift  Outcome: Progressing  Goal: Turns in bed with improved pain control throughout the shift  Outcome: Progressing  Goal: Walks with improved pain control throughout the shift  Outcome: Progressing  Goal: Performs ADL's with improved pain control throughout shift  Outcome: Progressing  Goal: Participates in PT with improved pain control throughout the shift  Outcome: Progressing  Goal: Free  from opioid side effects throughout the shift  Outcome: Progressing  Goal: Free from acute confusion related to pain meds throughout the shift  Outcome: Progressing

## 2025-03-11 NOTE — PROGRESS NOTES
03/11/25 1651   Discharge Planning   Expected Discharge Disposition SNF   Does the patient need discharge transport arranged? Yes     Received update from [facility] that [facility] has been approved for admission.      [facility] is not certified to proceed with discharge.   Request sent to Dr. Ling to complete to proceed with discharge    PASRR was initiated but unable to complete until MDGF is signed.     Discharge plan NOT secure  DO NOT DC without speaking to care coordination

## 2025-03-12 VITALS
BODY MASS INDEX: 47.48 KG/M2 | DIASTOLIC BLOOD PRESSURE: 59 MMHG | HEART RATE: 93 BPM | TEMPERATURE: 98.4 F | RESPIRATION RATE: 18 BRPM | WEIGHT: 268 LBS | SYSTOLIC BLOOD PRESSURE: 130 MMHG | OXYGEN SATURATION: 95 % | HEIGHT: 63 IN

## 2025-03-12 PROCEDURE — 96372 THER/PROPH/DIAG INJ SC/IM: CPT | Performed by: INTERNAL MEDICINE

## 2025-03-12 PROCEDURE — G0378 HOSPITAL OBSERVATION PER HR: HCPCS

## 2025-03-12 PROCEDURE — 2500000001 HC RX 250 WO HCPCS SELF ADMINISTERED DRUGS (ALT 637 FOR MEDICARE OP): Performed by: NURSE PRACTITIONER

## 2025-03-12 PROCEDURE — 2500000004 HC RX 250 GENERAL PHARMACY W/ HCPCS (ALT 636 FOR OP/ED): Performed by: INTERNAL MEDICINE

## 2025-03-12 PROCEDURE — 2500000001 HC RX 250 WO HCPCS SELF ADMINISTERED DRUGS (ALT 637 FOR MEDICARE OP): Performed by: INTERNAL MEDICINE

## 2025-03-12 PROCEDURE — 2500000002 HC RX 250 W HCPCS SELF ADMINISTERED DRUGS (ALT 637 FOR MEDICARE OP, ALT 636 FOR OP/ED): Performed by: INTERNAL MEDICINE

## 2025-03-12 RX ORDER — CARVEDILOL 12.5 MG/1
12.5 TABLET ORAL 2 TIMES DAILY
Start: 2025-03-12

## 2025-03-12 RX ORDER — LISINOPRIL 10 MG/1
10 TABLET ORAL NIGHTLY
Start: 2025-03-12

## 2025-03-12 RX ADMIN — EZETIMIBE 10 MG: 10 TABLET ORAL at 06:11

## 2025-03-12 RX ADMIN — PYRIDOXINE HCL TAB 50 MG 300 MG: 50 TAB at 08:28

## 2025-03-12 RX ADMIN — DOCUSATE SODIUM 100 MG: 100 CAPSULE, LIQUID FILLED ORAL at 08:29

## 2025-03-12 RX ADMIN — CALCIUM CARBONATE 1000 MG: 500 TABLET, CHEWABLE ORAL at 00:00

## 2025-03-12 RX ADMIN — CARVEDILOL 12.5 MG: 12.5 TABLET, FILM COATED ORAL at 08:28

## 2025-03-12 RX ADMIN — OXYCODONE HYDROCHLORIDE AND ACETAMINOPHEN 500 MG: 500 TABLET ORAL at 08:29

## 2025-03-12 RX ADMIN — SPIRONOLACTONE 12.5 MG: 25 TABLET ORAL at 08:29

## 2025-03-12 RX ADMIN — ASPIRIN 325 MG: 325 TABLET ORAL at 08:29

## 2025-03-12 RX ADMIN — BUSPIRONE HYDROCHLORIDE 10 MG: 10 TABLET ORAL at 08:29

## 2025-03-12 RX ADMIN — FOLIC ACID TAB 400 MCG 800 MCG: 400 TAB at 08:28

## 2025-03-12 RX ADMIN — Medication 1000 UNITS: at 06:11

## 2025-03-12 RX ADMIN — HYDROXYZINE HYDROCHLORIDE 25 MG: 25 TABLET, FILM COATED ORAL at 00:00

## 2025-03-12 RX ADMIN — HEPARIN SODIUM 7500 UNITS: 5000 INJECTION, SOLUTION INTRAVENOUS; SUBCUTANEOUS at 16:16

## 2025-03-12 RX ADMIN — BACLOFEN 5 MG: 10 TABLET ORAL at 08:29

## 2025-03-12 RX ADMIN — HEPARIN SODIUM 7500 UNITS: 5000 INJECTION, SOLUTION INTRAVENOUS; SUBCUTANEOUS at 06:11

## 2025-03-12 RX ADMIN — HYDROCORTISONE 1 APPLICATION: 25 LOTION TOPICAL at 08:33

## 2025-03-12 RX ADMIN — BACLOFEN 5 MG: 10 TABLET ORAL at 16:16

## 2025-03-12 ASSESSMENT — COGNITIVE AND FUNCTIONAL STATUS - GENERAL
DRESSING REGULAR LOWER BODY CLOTHING: A LITTLE
STANDING UP FROM CHAIR USING ARMS: A LITTLE
TOILETING: A LITTLE
HELP NEEDED FOR BATHING: A LITTLE
WALKING IN HOSPITAL ROOM: A LITTLE
MOBILITY SCORE: 17
MOVING FROM LYING ON BACK TO SITTING ON SIDE OF FLAT BED WITH BEDRAILS: A LITTLE
CLIMB 3 TO 5 STEPS WITH RAILING: A LOT
MOVING TO AND FROM BED TO CHAIR: A LITTLE
DRESSING REGULAR UPPER BODY CLOTHING: A LITTLE
TURNING FROM BACK TO SIDE WHILE IN FLAT BAD: A LITTLE
DAILY ACTIVITIY SCORE: 20

## 2025-03-12 ASSESSMENT — PAIN SCALES - GENERAL: PAINLEVEL_OUTOF10: 0 - NO PAIN

## 2025-03-12 ASSESSMENT — PAIN SCALES - WONG BAKER: WONGBAKER_NUMERICALRESPONSE: NO HURT

## 2025-03-12 NOTE — PROGRESS NOTES
03/12/25 1015   Discharge Planning   Living Arrangements Spouse/significant other;Children  (dtr when she is home from college)   Support Systems Spouse/significant other;Children   Assistance Needed A&Ox4, assistance with ADLs, utilizes a walker. Does not drive (uses [employer]). Bathroom is ADA compliant. Active with [facility] (SN/TP/OT), has a private RN 5hrs/week. PCP Dr. Deloris Krishnamurthy   Type of Residence Private residence   Number of Stairs to Enter Residence 1   Number of Stairs Within Residence 12  (to basement)   Do you have animals or pets at home? Yes   Type of Animals or Pets 1 cat   Home or Post Acute Services Post acute facilities (Rehab/SNF/etc)   Type of Post Acute Facility Services Skilled nursing   Expected Discharge Disposition SNF  ([facility]ert [facility]. TCC spoke with pt this AM about discharge planning. Pt is still unsure what she would like to do, feels that she needs SNF but is concerned about the copays.)   Does the patient need discharge transport arranged? Yes   RoundTrip coordination needed? Yes   Has discharge transport been arranged? No

## 2025-03-12 NOTE — PROGRESS NOTES
Mamta Paredes is a 54 y.o. female on day 0 of admission presenting with Fall, initial encounter.    Subjective   The patient was seen and examined.  Morbidly obese, l sitting in a chair.  Comfortable.  Did not appear acute distress.       Objective     Physical Exam  HEENT:  Head externally atraumatic,  extraocular movements intact, oral mucosa moist  Neck:  Supple, no JVP, no palpable adenopathy or thyromegaly.  No carotid bruit.  Chest:  Clear to auscultation and resonant.  Heart:  Regular rate and rhythm, no murmur or gallop could be appreciated.  Abdomen:  Soft, obese, nontender, bowel sounds present, normoactive, no palpable hepatosplenomegaly.  Extremities: Bilateral mild edema, pulses present, no cyanosis or clubbing.  CNS:  Patient alert, oriented to time, place and person.    No new deficit.  Cranial nerves 2-12 grossly intact  Skin:  No active rash.  Musculoskeletal:  No  apparent joint swelling or erythema, range of movement normal.  Last Recorded Vitals  Heart Rate:  [68-76]   Temp:  [36.6 °C (97.9 °F)-36.8 °C (98.2 °F)]   Resp:  [18-19]   BP: ()/(44-72)   SpO2:  [96 %-99 %]     Intake/Output last 3 Shifts:  I/O last 3 completed shifts:  In: 540 (4.4 mL/kg) [P.O.:540]  Out: 600 (4.9 mL/kg) [Urine:600 (0.1 mL/kg/hr)]  Weight: 121.6 kg     Relevant Results  No results found for the last 90 days.    No results found for this or any previous visit (from the past 24 hours).     Current Facility-Administered Medications:     acetaminophen (Tylenol) tablet 650 mg, 650 mg, oral, q4h PRN, 650 mg at 03/11/25 0935 **OR** acetaminophen (Tylenol) oral liquid 650 mg, 650 mg, oral, q4h PRN **OR** acetaminophen (Tylenol) suppository 650 mg, 650 mg, rectal, q4h PRN, Madhu Ling MD    albuterol 2.5 mg /3 mL (0.083 %) nebulizer solution 3 mL, 3 mL, nebulization, q4h PRN, Madhu Ling MD    ascorbic acid (Vitamin C) tablet 500 mg, 500 mg, oral, Daily, Madhu Ling MD, 500 mg at 03/12/25 7776     aspirin tablet 325 mg, 325 mg, oral, Daily, Madhu Ling MD, 325 mg at 03/12/25 0829    atorvastatin (Lipitor) tablet 80 mg, 80 mg, oral, Nightly, Madhu Ling MD, 80 mg at 03/11/25 2010    baclofen (Lioresal) tablet 5 mg, 5 mg, oral, TID, Madhu Ling MD, 5 mg at 03/12/25 0829    benzocaine-menthol (Cepastat Sore Throat) lozenge 1 lozenge, 1 lozenge, Mouth/Throat, q2h PRN, Madhu Ling MD    busPIRone (Buspar) tablet 10 mg, 10 mg, oral, BID, Madhu Ling MD, 10 mg at 03/12/25 0829    calcium carbonate (Tums) chewable tablet 1,000 mg, 1,000 mg, oral, Daily PRN, Madhu Ling MD, 1,000 mg at 03/12/25 0000    carvedilol (Coreg) tablet 12.5 mg, 12.5 mg, oral, BID, Madhu Ling MD, 12.5 mg at 03/12/25 0828    cholecalciferol (Vitamin D-3) tablet 1,000 Units, 1,000 Units, oral, Daily, Madhu Ling MD, 1,000 Units at 03/12/25 0611    dextromethorphan-guaifenesin (Robitussin DM)  mg/5 mL oral liquid 5 mL, 5 mL, oral, q4h PRN, Madhu Ling MD    docusate sodium (Colace) capsule 100 mg, 100 mg, oral, BID, Mamta Alcantar, NELLY-CNP, 100 mg at 03/12/25 0829    DULoxetine (Cymbalta) DR capsule 60 mg, 60 mg, oral, Nightly, Madhu Ling MD, 60 mg at 03/11/25 2010    ezetimibe (Zetia) tablet 10 mg, 10 mg, oral, Daily, Madhu Ling MD, 10 mg at 03/12/25 0611    fluticasone (Flonase) nasal spray 1 spray, 1 spray, Each Nostril, q12h PRN, Madhu Ling MD    folic acid (Folvite) tablet 800 mcg, 800 mcg, oral, Daily, Madhu Ling MD, 800 mcg at 03/12/25 0828    guaiFENesin (Mucinex) 12 hr tablet 600 mg, 600 mg, oral, q12h PRN, Madhu Ling MD    heparin (porcine) injection 7,500 Units, 7,500 Units, subcutaneous, q8h OLIVIA, Madhu Ling MD, 7,500 Units at 03/12/25 0611    hydrocortisone 2.5 % lotion 1 Application, 1 Application, Topical, BID, Madhu Ling MD, 1 Application at 03/12/25 0833    hydrOXYzine HCL (Atarax)  tablet 25 mg, 25 mg, oral, q6h PRN, Madhu Ling MD, 25 mg at 03/12/25 0000    lamoTRIgine (LaMICtal) tablet 100 mg, 100 mg, oral, Nightly, Madhu Ling MD, 100 mg at 03/11/25 2010    lisinopril tablet 10 mg, 10 mg, oral, Nightly, Madhu Ling MD, 10 mg at 03/11/25 2010    melatonin tablet 6 mg, 6 mg, oral, Nightly, Madhu Ling MD, 6 mg at 03/11/25 2010    polyethylene glycol (Glycolax, Miralax) packet 17 g, 17 g, oral, Daily PRN, Madhu Ling MD    pyridoxine (Vitamin B-6) tablet 200 mg, 200 mg, oral, Nightly, Madhu Ling MD, 200 mg at 03/11/25 2009    pyridoxine (Vitamin B-6) tablet 300 mg, 300 mg, oral, Daily, Madhu Ling MD, 300 mg at 03/12/25 0828    spironolactone (Aldactone) tablet 12.5 mg, 12.5 mg, oral, Daily, Madhu Ling MD, 12.5 mg at 03/12/25 0829   Assessment/Plan   Assessment & Plan  Fall, initial encounter  Recurrent falls  Morbid obese  History of CVA with left hemiparesis  Fracture second left toe  Hypertension  Hyperlipidemia  Coronary artery disease  Depression  Chronic systolic congestive heart failure  Homocystinuria  Intracranial vascular stenosis  Polycystic ovarian syndrome    Plan: Continue current medication.  Supportive care.  Patient has recurrent falls.  This appears to be mechanical fall.  Patient says she was coming out of the bathroom and she had a surgical to and left-sided weakness due to previous stroke.  Patient said her foot got caught in the door and she fell down.  Patient had 3 falls in the last week.  Radiological workup including x-ray of the pelvis, chest, and CT of the brain are negative.  Give physical therapy and Occupational Therapy.  Blood work is also negative.  Possible discharge soon per PT/OT recommendations.    Date of service: 3/9/2025    Plan: Continue current medications.  Give supportive care.  Give physical therapy and Occupational Therapy.  Monitor CBC and BMP.  Increase activity.  Control pain.   Patient medically stable.  Patient can be discharged arrangements are made.  Discussed with the patient and her family at the bedside.    3/11/2025    The patient was doing fine.  The patient is medically stable.  The patient can be discharged to rehab when arrangements are made.             Madhu Ling MD

## 2025-03-12 NOTE — PROGRESS NOTES
"Pt. Was slated to be discharged to a snf this evening but she refused to leave when the time came due to needing to pay $214 per day because she has already used her most of her snf benefits.     I spoke with her about home care services and she indicated she previously had home care through the [facility] and would like to resume care with them and granted me permission to send a referral to [facility].     She did also indicate that she does not feel safe returning to her home in her current condition and is \"on the fence\" about what to do. She is currently assisting paying tution for her daughter and feels she cannot afford the co-pay.     Plan: Referral sent to [facility]. Will need to attach home care orders when available and determine her plan of care goals.   "

## 2025-03-12 NOTE — CARE PLAN
The patient's goals for the shift include      The clinical goals for the shift include maintain safety    Problem: Pain - Adult  Goal: Verbalizes/displays adequate comfort level or baseline comfort level  Outcome: Progressing     Problem: Safety - Adult  Goal: Free from fall injury  Outcome: Progressing     Problem: Discharge Planning  Goal: Discharge to home or other facility with appropriate resources  Outcome: Progressing     Problem: Chronic Conditions and Co-morbidities  Goal: Patient's chronic conditions and co-morbidity symptoms are monitored and maintained or improved  Outcome: Progressing     Problem: Nutrition  Goal: Nutrient intake appropriate for maintaining nutritional needs  Outcome: Progressing     Problem: Skin  Goal: Decreased wound size/increased tissue granulation at next dressing change  Outcome: Progressing  Flowsheets (Taken 3/12/2025 1041)  Decreased wound size/increased tissue granulation at next dressing change:   Promote sleep for wound healing   Protective dressings over bony prominences  Goal: Participates in plan/prevention/treatment measures  Outcome: Progressing  Flowsheets (Taken 3/12/2025 1041)  Participates in plan/prevention/treatment measures:   Discuss with provider PT/OT consult   Increase activity/out of bed for meals   Elevate heels  Goal: Prevent/manage excess moisture  Outcome: Progressing  Flowsheets (Taken 3/12/2025 1041)  Prevent/manage excess moisture:   Moisturize dry skin   Monitor for/manage infection if present  Goal: Prevent/minimize sheer/friction injuries  Outcome: Progressing  Flowsheets (Taken 3/12/2025 1041)  Prevent/minimize sheer/friction injuries:   Increase activity/out of bed for meals   HOB 30 degrees or less  Goal: Promote/optimize nutrition  Outcome: Progressing  Flowsheets (Taken 3/12/2025 1041)  Promote/optimize nutrition:   Monitor/record intake including meals   Offer water/supplements/favorite foods  Goal: Promote skin healing  Outcome:  Progressing  Flowsheets (Taken 3/12/2025 1041)  Promote skin healing: Assess skin/pad under line(s)/device(s)     Problem: Bathing  Goal: LTG - Patient will utilize adaptive techniques to bathe body SUP  Outcome: Progressing     Problem: Dressings Lower Extremities  Goal: LTG - Patient will utilize adaptive techniques/equipment to dress lower body SUP  Outcome: Progressing     Problem: Dressing Upper Extremities  Goal: LTG - Patient will complete upper body dressing Independent  Outcome: Progressing     Problem: Grooming  Goal: LTG - Patient will complete daily grooming tasks SUP standing sinkside  Outcome: Progressing     Problem: Instrumental Activities of Daily Living  Goal: LTG - Patient will complete simulated simple IADL to promote independence with meal prep tasks  Outcome: Progressing     Problem: Toileting  Goal: LTG - Patient will complete daily toileting tasks SUP  Outcome: Progressing     Problem: Fall/Injury  Goal: Not fall by end of shift  Outcome: Progressing  Goal: Be free from injury by end of the shift  Outcome: Progressing  Goal: Verbalize understanding of personal risk factors for fall in the hospital  Outcome: Progressing  Goal: Verbalize understanding of risk factor reduction measures to prevent injury from fall in the home  Outcome: Progressing  Goal: Use assistive devices by end of the shift  Outcome: Progressing  Goal: Pace activities to prevent fatigue by end of the shift  Outcome: Progressing     Problem: Pain  Goal: Takes deep breaths with improved pain control throughout the shift  Outcome: Progressing  Goal: Turns in bed with improved pain control throughout the shift  Outcome: Progressing  Goal: Walks with improved pain control throughout the shift  Outcome: Progressing  Goal: Performs ADL's with improved pain control throughout shift  Outcome: Progressing  Goal: Participates in PT with improved pain control throughout the shift  Outcome: Progressing  Goal: Free from opioid side  effects throughout the shift  Outcome: Progressing  Goal: Free from acute confusion related to pain meds throughout the shift  Outcome: Progressing

## 2025-03-12 NOTE — CARE PLAN
The patient's goals for the shift include  sleep    The clinical goals for the shift include pt will remain safe and free from injury      Problem: Pain - Adult  Goal: Verbalizes/displays adequate comfort level or baseline comfort level  Outcome: Progressing     Problem: Safety - Adult  Goal: Free from fall injury  Outcome: Progressing     Problem: Discharge Planning  Goal: Discharge to home or other facility with appropriate resources  Outcome: Progressing     Problem: Chronic Conditions and Co-morbidities  Goal: Patient's chronic conditions and co-morbidity symptoms are monitored and maintained or improved  Outcome: Progressing     Problem: Nutrition  Goal: Nutrient intake appropriate for maintaining nutritional needs  Outcome: Progressing     Problem: Skin  Goal: Decreased wound size/increased tissue granulation at next dressing change  Outcome: Progressing  Flowsheets (Taken 3/12/2025 0021)  Decreased wound size/increased tissue granulation at next dressing change: Promote sleep for wound healing  Goal: Participates in plan/prevention/treatment measures  Outcome: Progressing  Flowsheets (Taken 3/12/2025 0021)  Participates in plan/prevention/treatment measures:   Discuss with provider PT/OT consult   Elevate heels   Increase activity/out of bed for meals  Goal: Prevent/manage excess moisture  Outcome: Progressing  Flowsheets (Taken 3/12/2025 0021)  Prevent/manage excess moisture:   Moisturize dry skin   Monitor for/manage infection if present  Goal: Prevent/minimize sheer/friction injuries  Outcome: Progressing  Flowsheets (Taken 3/12/2025 0021)  Prevent/minimize sheer/friction injuries:   Increase activity/out of bed for meals   HOB 30 degrees or less  Goal: Promote/optimize nutrition  Outcome: Progressing  Flowsheets (Taken 3/12/2025 0021)  Promote/optimize nutrition:   Monitor/record intake including meals   Offer water/supplements/favorite foods   Consume > 50% meals/supplements  Goal: Promote skin  healing  Outcome: Progressing  Flowsheets (Taken 3/12/2025 0021)  Promote skin healing: Assess skin/pad under line(s)/device(s)     Problem: Bathing  Goal: LTG - Patient will utilize adaptive techniques to bathe body SUP  Outcome: Progressing     Problem: Dressings Lower Extremities  Goal: LTG - Patient will utilize adaptive techniques/equipment to dress lower body SUP  Outcome: Progressing     Problem: Dressing Upper Extremities  Goal: LTG - Patient will complete upper body dressing Independent  Outcome: Progressing     Problem: Grooming  Goal: LTG - Patient will complete daily grooming tasks SUP standing sinkside  Outcome: Progressing     Problem: Instrumental Activities of Daily Living  Goal: LTG - Patient will complete simulated simple IADL to promote independence with meal prep tasks  Outcome: Progressing     Problem: Toileting  Goal: LTG - Patient will complete daily toileting tasks SUP  Outcome: Progressing     Problem: Fall/Injury  Goal: Not fall by end of shift  Outcome: Progressing  Goal: Be free from injury by end of the shift  Outcome: Progressing  Goal: Verbalize understanding of personal risk factors for fall in the hospital  Outcome: Progressing  Goal: Verbalize understanding of risk factor reduction measures to prevent injury from fall in the home  Outcome: Progressing  Goal: Use assistive devices by end of the shift  Outcome: Progressing  Goal: Pace activities to prevent fatigue by end of the shift  Outcome: Progressing     Problem: Pain  Goal: Takes deep breaths with improved pain control throughout the shift  Outcome: Progressing  Goal: Turns in bed with improved pain control throughout the shift  Outcome: Progressing  Goal: Walks with improved pain control throughout the shift  Outcome: Progressing  Goal: Performs ADL's with improved pain control throughout shift  Outcome: Progressing  Goal: Participates in PT with improved pain control throughout the shift  Outcome: Progressing  Goal: Free from  opioid side effects throughout the shift  Outcome: Progressing  Goal: Free from acute confusion related to pain meds throughout the shift  Outcome: Progressing

## 2025-03-12 NOTE — PROGRESS NOTES
Mamta Paredes is a 54 y.o. female on day 0 of admission presenting with Fall, initial encounter.    Discharge written and arrangements made to skilled nursing rehabilitation however she did not discharge - per the record she refused to leave due to needing to pay the co-pay.    Subjective   Patient seen and examined.  Resting sitting up in the chair in no acute distress.  Awake alert oriented x 3.  Dizziness when up.  Otherwise no complaints.  No pain.  Anxious about discharge.  Discussed discharge plan.  States she will not discharge to skilled nursing rehabilitation due to the cost.  States she will discharge home.    Objective     Physical Exam  Vitals and nursing note reviewed.   Constitutional:       General: She is not in acute distress.     Appearance: Normal appearance. She is obese. She is not ill-appearing, toxic-appearing or diaphoretic.   HENT:      Head: Normocephalic and atraumatic.      Right Ear: External ear normal.      Left Ear: External ear normal.      Nose: Nose normal.      Mouth/Throat:      Mouth: Mucous membranes are moist.      Pharynx: Oropharynx is clear.   Eyes:      Extraocular Movements: Extraocular movements intact.      Conjunctiva/sclera: Conjunctivae normal.      Pupils: Pupils are equal, round, and reactive to light.   Cardiovascular:      Rate and Rhythm: Normal rate and regular rhythm.      Pulses: Normal pulses.      Heart sounds: Normal heart sounds. No murmur heard.  Pulmonary:      Effort: Pulmonary effort is normal. No respiratory distress.      Breath sounds: Normal breath sounds. No wheezing, rhonchi or rales.   Abdominal:      General: Bowel sounds are normal. There is no distension.      Palpations: Abdomen is soft.      Tenderness: There is no abdominal tenderness.   Genitourinary:     Comments: Deferred.  Musculoskeletal:         General: No swelling or tenderness. Normal range of motion.      Cervical back: Normal range of motion and neck supple.      Right lower  "leg: No edema.      Left lower leg: No edema.      Comments: Left foot boot in place.   Skin:     General: Skin is warm and dry.      Capillary Refill: Capillary refill takes less than 2 seconds.   Neurological:      General: No focal deficit present.      Mental Status: She is alert and oriented to person, place, and time.      Comments: Generalized weakness. No focal weakness. Gait deferred.   Psychiatric:         Mood and Affect: Mood normal.         Behavior: Behavior normal.       Last Recorded Vitals  Blood pressure 130/59, pulse 93, temperature 36.9 °C (98.4 °F), temperature source Oral, resp. rate 18, height 1.6 m (5' 3\"), weight 122 kg (268 lb), SpO2 95%.    Intake/Output last 3 Shifts:  I/O last 3 completed shifts:  In: 540 (4.4 mL/kg) [P.O.:540]  Out: 600 (4.9 mL/kg) [Urine:600 (0.1 mL/kg/hr)]  Weight: 121.6 kg     Relevant Results  No results found for this or any previous visit (from the past 24 hours).    No results found for the last 90 days.    No results found.    Scheduled medications  ascorbic acid, 500 mg, oral, Daily  aspirin, 325 mg, oral, Daily  atorvastatin, 80 mg, oral, Nightly  baclofen, 5 mg, oral, TID  busPIRone, 10 mg, oral, BID  carvedilol, 12.5 mg, oral, BID  cholecalciferol, 1,000 Units, oral, Daily  docusate sodium, 100 mg, oral, BID  DULoxetine, 60 mg, oral, Nightly  ezetimibe, 10 mg, oral, Daily  folic acid, 800 mcg, oral, Daily  heparin (porcine), 7,500 Units, subcutaneous, q8h OLIVIA  hydrocortisone, 1 Application, Topical, BID  lamoTRIgine, 100 mg, oral, Nightly  [Held by provider] lisinopril, 10 mg, oral, Nightly  melatonin, 6 mg, oral, Nightly  pyridoxine, 200 mg, oral, Nightly  pyridoxine, 300 mg, oral, Daily  spironolactone, 12.5 mg, oral, Daily      Continuous medications     PRN medications  PRN medications: acetaminophen **OR** acetaminophen **OR** acetaminophen, albuterol, benzocaine-menthol, calcium carbonate, dextromethorphan-guaifenesin, fluticasone, guaiFENesin, " hydrOXYzine HCL, polyethylene glycol    ASSESSMENT:  Fall  Generalized weakness  Morbid obesity  Hypertension - hypotension  Hyperlipidemia  Chronic systolic congestive hear failure  History of CVA with left hemiparesis  Depression  Anxiety  Constipation resolved    PLAN:  Vital signs reviewed.  Mildly hypotensive 95/44 this am.  Check orthostatic blood pressures.  Monitor.  Add parameters to medications Lisinopril hold for SBP < 110 mmhg, Carvedilol hold SBP < 100 mmhg, HR < 60 bpm.  Monitor blood pressure.   Continue current treatment.  PT/OT.  Fall precautions.  Up with assistance only.  Bed and chair alarm at all times.  Pressure ulcer prevention measures.  Turn and reposition every 2 hours and as needed.  Heels off bed.  Offloading.  DVT prophylaxis.  Heparin subcutaneous.  GI prophylaxis.  Supportive care.  Patient reassured.  PT/OT recommend moderate intensity level of continued care.  Case management following for discharge planning.  Awaiting discharge arrangements.  Follow-up blood pressures and anticipate discharge when arrangements are made by case management.  Discussed with patient, nursing and Dr. Ling.     ADDENDUM:  Orthostatic vital signs negative.  Blood pressures improved.  Per case management, discharge plan home with [facility] home health care, new new home health care orders required due to observation status.  Discussed with Dr. Ling.  Okay to discharge when arrangements are made by case management.    1549: Blood pressure stable, 130/59.      NELLY Willard-CNP

## 2025-03-12 NOTE — ASSESSMENT & PLAN NOTE
Recurrent falls  Morbid obese  History of CVA with left hemiparesis  Fracture second left toe  Hypertension  Hyperlipidemia  Coronary artery disease  Depression  Chronic systolic congestive heart failure  Homocystinuria  Intracranial vascular stenosis  Polycystic ovarian syndrome    Plan: Continue current medication.  Supportive care.  Patient has recurrent falls.  This appears to be mechanical fall.  Patient says she was coming out of the bathroom and she had a surgical to and left-sided weakness due to previous stroke.  Patient said her foot got caught in the door and she fell down.  Patient had 3 falls in the last week.  Radiological workup including x-ray of the pelvis, chest, and CT of the brain are negative.  Give physical therapy and Occupational Therapy.  Blood work is also negative.  Possible discharge soon per PT/OT recommendations.    Date of service: 3/9/2025    Plan: Continue current medications.  Give supportive care.  Give physical therapy and Occupational Therapy.  Monitor CBC and BMP.  Increase activity.  Control pain.  Patient medically stable.  Patient can be discharged arrangements are made.  Discussed with the patient and her family at the bedside.    3/11/2025    The patient was doing fine.  The patient is medically stable.  The patient can be discharged to rehab when arrangements are made.

## 2025-03-12 NOTE — PROGRESS NOTES
03/12/25 1319   Discharge Planning   Expected Discharge Disposition Home Health   Does the patient need discharge transport arranged? No     Met with patient at bedside to discuss discharge planning.   Patient updated that she is in OBS status, discharge is written, and we need to discuss final NSOC preference.      Patient is refusing discharge to SNF, states she can not afford it regardless of payment plan options.   Patient plans to return home with her spouse and resume [facility] services.     [facility] updated on the above, patient was previously active, still in OBS status, should not need new orders.  Awaiting confirmation from home care agency.     Bedside nurse and provider updated on final discharge plans.     UPDATE 1330:  Received notification from [facility], no new orders are needed to resume.  AVS sent to agency.    DC plan secure.

## 2025-03-28 ENCOUNTER — NURSING HOME VISIT (OUTPATIENT)
Dept: POST ACUTE CARE | Facility: EXTERNAL LOCATION | Age: 55
End: 2025-03-28
Payer: MEDICARE

## 2025-03-28 DIAGNOSIS — I63.89 CEREBROVASCULAR ACCIDENT (CVA) DUE TO OTHER MECHANISM: Primary | ICD-10-CM

## 2025-03-28 DIAGNOSIS — G89.29 CHRONIC MIDLINE LOW BACK PAIN WITHOUT SCIATICA: ICD-10-CM

## 2025-03-28 DIAGNOSIS — G81.94 LEFT HEMIPLEGIA (MULTI): ICD-10-CM

## 2025-03-28 DIAGNOSIS — M54.50 CHRONIC MIDLINE LOW BACK PAIN WITHOUT SCIATICA: ICD-10-CM

## 2025-03-28 PROCEDURE — 99305 1ST NF CARE MODERATE MDM 35: CPT | Performed by: FAMILY MEDICINE

## 2025-03-28 NOTE — LETTER
Patient: Mamta Paredes  : 1970    Encounter Date: 2025    Subjective  Patient ID: Mamta Paredes is a 54 y.o. female who is acute skilled care being seen and evaluated for multiple medical problems.    HPI  54-year-old  female with a past medical history significant for CVA, presenting with a fall.  She had been at another facility and discharged home and then fell 3 times at home.  She reported left toe fracture surgical boot in place.  CT of the head chest x-ray pelvis x-ray were unremarkable.  She has complaints of ongoing low back pain and is wondering if she can have a lidocaine patch to her low back.  CBC CMP ordered and currently pending.    Review of Systems   Constitutional:  Positive for fatigue. Negative for chills and fever.   HENT:  Negative for congestion, hearing loss and sore throat.    Eyes:  Negative for visual disturbance.   Respiratory:  Negative for cough, shortness of breath and wheezing.    Cardiovascular:  Negative for chest pain and palpitations.   Gastrointestinal:  Negative for abdominal pain, constipation, diarrhea, nausea and vomiting.   Genitourinary:  Negative for dysuria, frequency, hematuria and urgency.   Musculoskeletal:  Positive for gait problem.   Skin:  Negative for rash.   Neurological:  Positive for weakness. Negative for dizziness, syncope and headaches.   Psychiatric/Behavioral:  Negative for confusion and dysphoric mood. The patient is not nervous/anxious.        Objective  There were no vitals taken for this visit.    Physical Exam  Constitutional:       General: She is not in acute distress.     Appearance: Normal appearance.   HENT:      Head: Normocephalic.      Mouth/Throat:      Mouth: Mucous membranes are moist.      Pharynx: Oropharynx is clear.   Eyes:      General:         Right eye: No discharge.         Left eye: No discharge.      Extraocular Movements: Extraocular movements intact.      Conjunctiva/sclera: Conjunctivae normal.       Pupils: Pupils are equal, round, and reactive to light.   Cardiovascular:      Rate and Rhythm: Normal rate and regular rhythm.      Heart sounds: Normal heart sounds. No murmur heard.  Pulmonary:      Effort: Pulmonary effort is normal.      Breath sounds: Normal breath sounds. No wheezing or rhonchi.   Abdominal:      General: There is no distension.      Palpations: Abdomen is soft. There is no mass.      Tenderness: There is no abdominal tenderness.   Musculoskeletal:         General: No swelling, tenderness or deformity.   Lymphadenopathy:      Cervical: No cervical adenopathy.   Skin:     Coloration: Skin is not jaundiced.   Neurological:      Mental Status: She is alert. Mental status is at baseline.      Motor: Weakness present.      Gait: Gait abnormal.   Psychiatric:         Mood and Affect: Mood normal.         Assessment/Plan  Problem List Items Addressed This Visit    None  Visit Diagnoses         Codes    Cerebrovascular accident (CVA) due to other mechanism    -  Primary I63.89    Left hemiplegia (Multi)     G81.94    Chronic midline low back pain without sciatica     M54.50, G89.29            CBC CMP pending, hospital records reviewed, add lidocaine patch to low back      Electronically Signed By: Annita Saucedo MD   3/31/25  1:05 PM

## 2025-03-31 ASSESSMENT — ENCOUNTER SYMPTOMS
NERVOUS/ANXIOUS: 0
HEMATURIA: 0
VOMITING: 0
DYSPHORIC MOOD: 0
DIZZINESS: 0
DYSURIA: 0
DIARRHEA: 0
CONFUSION: 0
NAUSEA: 0
ABDOMINAL PAIN: 0
CHILLS: 0
CONSTIPATION: 0
FEVER: 0
SORE THROAT: 0
WHEEZING: 0
WEAKNESS: 1
COUGH: 0
FREQUENCY: 0
PALPITATIONS: 0
HEADACHES: 0
SHORTNESS OF BREATH: 0
FATIGUE: 1

## 2025-03-31 NOTE — PROGRESS NOTES
Subjective   Patient ID: Mamta Paredes is a 54 y.o. female who is acute skilled care being seen and evaluated for multiple medical problems.    HPI  54-year-old  female with a past medical history significant for CVA, presenting with a fall.  She had been at another facility and discharged home and then fell 3 times at home.  She reported left toe fracture surgical boot in place.  CT of the head chest x-ray pelvis x-ray were unremarkable.  She has complaints of ongoing low back pain and is wondering if she can have a lidocaine patch to her low back.  CBC CMP ordered and currently pending.    Review of Systems   Constitutional:  Positive for fatigue. Negative for chills and fever.   HENT:  Negative for congestion, hearing loss and sore throat.    Eyes:  Negative for visual disturbance.   Respiratory:  Negative for cough, shortness of breath and wheezing.    Cardiovascular:  Negative for chest pain and palpitations.   Gastrointestinal:  Negative for abdominal pain, constipation, diarrhea, nausea and vomiting.   Genitourinary:  Negative for dysuria, frequency, hematuria and urgency.   Musculoskeletal:  Positive for gait problem.   Skin:  Negative for rash.   Neurological:  Positive for weakness. Negative for dizziness, syncope and headaches.   Psychiatric/Behavioral:  Negative for confusion and dysphoric mood. The patient is not nervous/anxious.        Objective   There were no vitals taken for this visit.    Physical Exam  Constitutional:       General: She is not in acute distress.     Appearance: Normal appearance.   HENT:      Head: Normocephalic.      Mouth/Throat:      Mouth: Mucous membranes are moist.      Pharynx: Oropharynx is clear.   Eyes:      General:         Right eye: No discharge.         Left eye: No discharge.      Extraocular Movements: Extraocular movements intact.      Conjunctiva/sclera: Conjunctivae normal.      Pupils: Pupils are equal, round, and reactive to light.    Cardiovascular:      Rate and Rhythm: Normal rate and regular rhythm.      Heart sounds: Normal heart sounds. No murmur heard.  Pulmonary:      Effort: Pulmonary effort is normal.      Breath sounds: Normal breath sounds. No wheezing or rhonchi.   Abdominal:      General: There is no distension.      Palpations: Abdomen is soft. There is no mass.      Tenderness: There is no abdominal tenderness.   Musculoskeletal:         General: No swelling, tenderness or deformity.   Lymphadenopathy:      Cervical: No cervical adenopathy.   Skin:     Coloration: Skin is not jaundiced.   Neurological:      Mental Status: She is alert. Mental status is at baseline.      Motor: Weakness present.      Gait: Gait abnormal.   Psychiatric:         Mood and Affect: Mood normal.         Assessment/Plan   Problem List Items Addressed This Visit    None  Visit Diagnoses         Codes    Cerebrovascular accident (CVA) due to other mechanism    -  Primary I63.89    Left hemiplegia (Multi)     G81.94    Chronic midline low back pain without sciatica     M54.50, G89.29            CBC CMP pending, hospital records reviewed, add lidocaine patch to low back

## 2025-04-01 ENCOUNTER — NURSING HOME VISIT (OUTPATIENT)
Dept: POST ACUTE CARE | Facility: EXTERNAL LOCATION | Age: 55
End: 2025-04-01
Payer: MEDICARE

## 2025-04-01 DIAGNOSIS — F41.1 GAD (GENERALIZED ANXIETY DISORDER): ICD-10-CM

## 2025-04-01 DIAGNOSIS — R53.1 WEAKNESS: ICD-10-CM

## 2025-04-01 DIAGNOSIS — I63.89 CEREBROVASCULAR ACCIDENT (CVA) DUE TO OTHER MECHANISM: Primary | ICD-10-CM

## 2025-04-01 PROCEDURE — 99309 SBSQ NF CARE MODERATE MDM 30: CPT | Performed by: NURSE PRACTITIONER

## 2025-04-01 NOTE — LETTER
Patient: Mamta Paredes  : 1970    Encounter Date: 2025    Subjective  Patient ID: Mamta Paredes is a 54 y.o. female who presents for Extremity Weakness.    Following up withpatient who came to facility for PT/OT due to frequent falls at home. She had fractured her toes and was placed in a walking foot and had difficulty moving with this on which caused falls. She has been participating in therapies and states that her toes are not hurting as much any longer. She will see podiatry at the end of this week. She was started on PRN ativan for CALIN symptoms yesterday. She has a history of CVA and remains on statin         Review of Systems   Musculoskeletal:  Positive for gait problem.   Neurological:  Positive for weakness.   Psychiatric/Behavioral:  The patient is nervous/anxious.        Objective  /78   Pulse 79   Temp 36.8 °C (98.2 °F)   Resp 16   Wt 121 kg (267 lb)   SpO2 99%   BMI 47.30 kg/m²     Physical Exam  Constitutional:       General: She is not in acute distress.     Appearance: She is not ill-appearing.   HENT:      Mouth/Throat:      Mouth: Mucous membranes are moist.   Eyes:      Conjunctiva/sclera: Conjunctivae normal.   Cardiovascular:      Rate and Rhythm: Normal rate and regular rhythm.   Pulmonary:      Effort: Pulmonary effort is normal.      Breath sounds: No wheezing, rhonchi or rales.   Musculoskeletal:      Comments: Seen up in bed   Skin:     General: Skin is warm and dry.   Neurological:      Mental Status: She is alert. Mental status is at baseline.   Psychiatric:         Mood and Affect: Mood normal.         Assessment/Plan  Diagnoses and all orders for this visit:  Diagnoses and all orders for this visit:  Cerebrovascular accident (CVA) due to other mechanism  Comments:  cont with statin  Weakness  Comments:  cont with PT/OT  CALIN (generalized anxiety disorder)  Comments:  cont with buspar, cymbalta, PRN ativan               Electronically Signed By: Marisa PARRA  KENIA Guillen   4/2/25  4:45 PM

## 2025-04-02 VITALS
TEMPERATURE: 98.2 F | WEIGHT: 267 LBS | OXYGEN SATURATION: 99 % | SYSTOLIC BLOOD PRESSURE: 137 MMHG | RESPIRATION RATE: 16 BRPM | HEART RATE: 79 BPM | BODY MASS INDEX: 47.3 KG/M2 | DIASTOLIC BLOOD PRESSURE: 78 MMHG

## 2025-04-02 ASSESSMENT — ENCOUNTER SYMPTOMS
WEAKNESS: 1
NERVOUS/ANXIOUS: 1

## 2025-04-02 NOTE — PROGRESS NOTES
Subjective   Patient ID: Mamta Paredes is a 54 y.o. female who presents for Extremity Weakness.    Following up withpatient who came to facility for PT/OT due to frequent falls at home. She had fractured her toes and was placed in a walking foot and had difficulty moving with this on which caused falls. She has been participating in therapies and states that her toes are not hurting as much any longer. She will see podiatry at the end of this week. She was started on PRN ativan for CALIN symptoms yesterday. She has a history of CVA and remains on statin         Review of Systems   Musculoskeletal:  Positive for gait problem.   Neurological:  Positive for weakness.   Psychiatric/Behavioral:  The patient is nervous/anxious.        Objective   /78   Pulse 79   Temp 36.8 °C (98.2 °F)   Resp 16   Wt 121 kg (267 lb)   SpO2 99%   BMI 47.30 kg/m²     Physical Exam  Constitutional:       General: She is not in acute distress.     Appearance: She is not ill-appearing.   HENT:      Mouth/Throat:      Mouth: Mucous membranes are moist.   Eyes:      Conjunctiva/sclera: Conjunctivae normal.   Cardiovascular:      Rate and Rhythm: Normal rate and regular rhythm.   Pulmonary:      Effort: Pulmonary effort is normal.      Breath sounds: No wheezing, rhonchi or rales.   Musculoskeletal:      Comments: Seen up in bed   Skin:     General: Skin is warm and dry.   Neurological:      Mental Status: She is alert. Mental status is at baseline.   Psychiatric:         Mood and Affect: Mood normal.         Assessment/Plan   Diagnoses and all orders for this visit:  Diagnoses and all orders for this visit:  Cerebrovascular accident (CVA) due to other mechanism  Comments:  cont with statin  Weakness  Comments:  cont with PT/OT  CALIN (generalized anxiety disorder)  Comments:  cont with busparisabelleta, PRN ativan

## 2025-04-07 ENCOUNTER — NURSING HOME VISIT (OUTPATIENT)
Dept: POST ACUTE CARE | Facility: EXTERNAL LOCATION | Age: 55
End: 2025-04-07
Payer: MEDICARE

## 2025-04-07 DIAGNOSIS — I63.89 CEREBROVASCULAR ACCIDENT (CVA) DUE TO OTHER MECHANISM: ICD-10-CM

## 2025-04-07 DIAGNOSIS — G81.94 LEFT HEMIPLEGIA (MULTI): ICD-10-CM

## 2025-04-07 DIAGNOSIS — F41.1 GAD (GENERALIZED ANXIETY DISORDER): Primary | ICD-10-CM

## 2025-04-07 PROCEDURE — 99308 SBSQ NF CARE LOW MDM 20: CPT | Performed by: FAMILY MEDICINE

## 2025-04-07 NOTE — LETTER
Patient: Mamta Paredes  : 1970    Encounter Date: 2025    Subjective  Patient ID: Mamta Paredes is a 55 y.o. female who is acute skilled care being seen and evaluated for multiple medical problems.    HPI  54-year-old  female with a past medical history significant for CVA, presenting with a fall.  She had been at another facility and discharged home and then fell 3 times at home.  She reported left toe fracture surgical boot in place.  CT of the head chest x-ray pelvis x-ray were unremarkable.  She continues therapy, labs ok end of march.     Review of Systems   Constitutional:  Positive for fatigue. Negative for chills and fever.   HENT:  Negative for congestion, hearing loss and sore throat.    Eyes:  Negative for visual disturbance.   Respiratory:  Negative for cough, shortness of breath and wheezing.    Cardiovascular:  Negative for chest pain and palpitations.   Gastrointestinal:  Negative for abdominal pain, constipation, diarrhea, nausea and vomiting.   Genitourinary:  Negative for dysuria, frequency, hematuria and urgency.   Musculoskeletal:  Positive for gait problem.   Skin:  Negative for rash.   Neurological:  Positive for weakness. Negative for dizziness, syncope and headaches.   Psychiatric/Behavioral:  Negative for confusion and dysphoric mood. The patient is not nervous/anxious.        Objective  There were no vitals taken for this visit.    Physical Exam  Vitals reviewed: 127/64 98.1 78 wt 265.   Constitutional:       General: She is not in acute distress.     Appearance: Normal appearance.   HENT:      Head: Normocephalic.      Mouth/Throat:      Mouth: Mucous membranes are moist.      Pharynx: Oropharynx is clear.   Eyes:      General:         Right eye: No discharge.         Left eye: No discharge.      Extraocular Movements: Extraocular movements intact.      Conjunctiva/sclera: Conjunctivae normal.      Pupils: Pupils are equal, round, and reactive to light.    Cardiovascular:      Rate and Rhythm: Normal rate and regular rhythm.      Heart sounds: Normal heart sounds. No murmur heard.  Pulmonary:      Effort: Pulmonary effort is normal.      Breath sounds: Normal breath sounds. No wheezing or rhonchi.   Abdominal:      General: There is no distension.      Palpations: Abdomen is soft. There is no mass.      Tenderness: There is no abdominal tenderness.   Musculoskeletal:         General: No swelling, tenderness or deformity.   Lymphadenopathy:      Cervical: No cervical adenopathy.   Skin:     Coloration: Skin is not jaundiced.   Neurological:      Mental Status: She is alert. Mental status is at baseline.      Motor: Weakness present.      Gait: Gait abnormal.   Psychiatric:         Mood and Affect: Mood normal.         Assessment/Plan  Problem List Items Addressed This Visit    None  Visit Diagnoses         Codes    CALIN (generalized anxiety disorder)    -  Primary F41.1    Left hemiplegia (Multi)     G81.94    Cerebrovascular accident (CVA) due to other mechanism     I63.89          Labs stable  Continue therapies      Electronically Signed By: Annita Saucedo MD   4/8/25  3:37 PM

## 2025-04-08 ASSESSMENT — ENCOUNTER SYMPTOMS
FEVER: 0
VOMITING: 0
COUGH: 0
ABDOMINAL PAIN: 0
HEMATURIA: 0
NERVOUS/ANXIOUS: 0
CONFUSION: 0
DYSPHORIC MOOD: 0
NAUSEA: 0
HEADACHES: 0
FREQUENCY: 0
FATIGUE: 1
CONSTIPATION: 0
SORE THROAT: 0
WHEEZING: 0
DIZZINESS: 0
SHORTNESS OF BREATH: 0
CHILLS: 0
DIARRHEA: 0
WEAKNESS: 1
DYSURIA: 0
PALPITATIONS: 0

## 2025-04-08 NOTE — PROGRESS NOTES
Subjective   Patient ID: Mamta Paredes is a 55 y.o. female who is acute skilled care being seen and evaluated for multiple medical problems.    HPI  54-year-old  female with a past medical history significant for CVA, presenting with a fall.  She had been at another facility and discharged home and then fell 3 times at home.  She reported left toe fracture surgical boot in place.  CT of the head chest x-ray pelvis x-ray were unremarkable.  She continues therapy, labs ok end of march.     Review of Systems   Constitutional:  Positive for fatigue. Negative for chills and fever.   HENT:  Negative for congestion, hearing loss and sore throat.    Eyes:  Negative for visual disturbance.   Respiratory:  Negative for cough, shortness of breath and wheezing.    Cardiovascular:  Negative for chest pain and palpitations.   Gastrointestinal:  Negative for abdominal pain, constipation, diarrhea, nausea and vomiting.   Genitourinary:  Negative for dysuria, frequency, hematuria and urgency.   Musculoskeletal:  Positive for gait problem.   Skin:  Negative for rash.   Neurological:  Positive for weakness. Negative for dizziness, syncope and headaches.   Psychiatric/Behavioral:  Negative for confusion and dysphoric mood. The patient is not nervous/anxious.        Objective   There were no vitals taken for this visit.    Physical Exam  Vitals reviewed: 127/64 98.1 78 wt 265.   Constitutional:       General: She is not in acute distress.     Appearance: Normal appearance.   HENT:      Head: Normocephalic.      Mouth/Throat:      Mouth: Mucous membranes are moist.      Pharynx: Oropharynx is clear.   Eyes:      General:         Right eye: No discharge.         Left eye: No discharge.      Extraocular Movements: Extraocular movements intact.      Conjunctiva/sclera: Conjunctivae normal.      Pupils: Pupils are equal, round, and reactive to light.   Cardiovascular:      Rate and Rhythm: Normal rate and regular rhythm.       Heart sounds: Normal heart sounds. No murmur heard.  Pulmonary:      Effort: Pulmonary effort is normal.      Breath sounds: Normal breath sounds. No wheezing or rhonchi.   Abdominal:      General: There is no distension.      Palpations: Abdomen is soft. There is no mass.      Tenderness: There is no abdominal tenderness.   Musculoskeletal:         General: No swelling, tenderness or deformity.   Lymphadenopathy:      Cervical: No cervical adenopathy.   Skin:     Coloration: Skin is not jaundiced.   Neurological:      Mental Status: She is alert. Mental status is at baseline.      Motor: Weakness present.      Gait: Gait abnormal.   Psychiatric:         Mood and Affect: Mood normal.         Assessment/Plan   Problem List Items Addressed This Visit    None  Visit Diagnoses         Codes    CALIN (generalized anxiety disorder)    -  Primary F41.1    Left hemiplegia (Multi)     G81.94    Cerebrovascular accident (CVA) due to other mechanism     I63.89          Labs stable  Continue therapies

## 2025-06-20 ENCOUNTER — HOSPITAL ENCOUNTER (EMERGENCY)
Facility: HOSPITAL | Age: 55
Discharge: HOME | End: 2025-06-20
Payer: MEDICARE

## 2025-06-20 ENCOUNTER — APPOINTMENT (OUTPATIENT)
Dept: RADIOLOGY | Facility: HOSPITAL | Age: 55
End: 2025-06-20
Payer: MEDICARE

## 2025-06-20 VITALS
SYSTOLIC BLOOD PRESSURE: 128 MMHG | HEART RATE: 96 BPM | TEMPERATURE: 97.7 F | RESPIRATION RATE: 18 BRPM | WEIGHT: 267 LBS | OXYGEN SATURATION: 98 % | BODY MASS INDEX: 47.31 KG/M2 | DIASTOLIC BLOOD PRESSURE: 82 MMHG | HEIGHT: 63 IN

## 2025-06-20 DIAGNOSIS — S80.812A ABRASION OF LEFT LOWER EXTREMITY, INITIAL ENCOUNTER: ICD-10-CM

## 2025-06-20 DIAGNOSIS — R06.2 WHEEZING: Primary | ICD-10-CM

## 2025-06-20 PROCEDURE — 99283 EMERGENCY DEPT VISIT LOW MDM: CPT | Mod: 25

## 2025-06-20 PROCEDURE — 94640 AIRWAY INHALATION TREATMENT: CPT | Mod: 59

## 2025-06-20 PROCEDURE — 71045 X-RAY EXAM CHEST 1 VIEW: CPT

## 2025-06-20 PROCEDURE — 2500000004 HC RX 250 GENERAL PHARMACY W/ HCPCS (ALT 636 FOR OP/ED): Performed by: PHYSICIAN ASSISTANT

## 2025-06-20 PROCEDURE — 2500000002 HC RX 250 W HCPCS SELF ADMINISTERED DRUGS (ALT 637 FOR MEDICARE OP, ALT 636 FOR OP/ED): Performed by: PHYSICIAN ASSISTANT

## 2025-06-20 PROCEDURE — 71045 X-RAY EXAM CHEST 1 VIEW: CPT | Mod: FOREIGN READ | Performed by: RADIOLOGY

## 2025-06-20 RX ORDER — ALBUTEROL SULFATE 90 UG/1
2 INHALANT RESPIRATORY (INHALATION) EVERY 4 HOURS PRN
Qty: 18 G | Refills: 0 | Status: SHIPPED | OUTPATIENT
Start: 2025-06-20 | End: 2025-07-20

## 2025-06-20 RX ORDER — PREDNISONE 20 MG/1
60 TABLET ORAL ONCE
Status: COMPLETED | OUTPATIENT
Start: 2025-06-20 | End: 2025-06-20

## 2025-06-20 RX ORDER — AZITHROMYCIN 250 MG/1
250 TABLET, FILM COATED ORAL DAILY
Qty: 6 TABLET | Refills: 0 | Status: SHIPPED | OUTPATIENT
Start: 2025-06-20 | End: 2025-06-25

## 2025-06-20 RX ORDER — IPRATROPIUM BROMIDE AND ALBUTEROL SULFATE 2.5; .5 MG/3ML; MG/3ML
3 SOLUTION RESPIRATORY (INHALATION) ONCE
Status: COMPLETED | OUTPATIENT
Start: 2025-06-20 | End: 2025-06-20

## 2025-06-20 RX ORDER — METHYLPREDNISOLONE 4 MG/1
TABLET ORAL
Qty: 21 TABLET | Refills: 0 | Status: SHIPPED | OUTPATIENT
Start: 2025-06-20 | End: 2025-06-26

## 2025-06-20 RX ADMIN — IPRATROPIUM BROMIDE AND ALBUTEROL SULFATE 3 ML: 2.5; .5 SOLUTION RESPIRATORY (INHALATION) at 16:05

## 2025-06-20 RX ADMIN — PREDNISONE 60 MG: 20 TABLET ORAL at 16:04

## 2025-06-20 RX ADMIN — IPRATROPIUM BROMIDE AND ALBUTEROL SULFATE 3 ML: 2.5; .5 SOLUTION RESPIRATORY (INHALATION) at 16:06

## 2025-06-20 ASSESSMENT — PAIN SCALES - GENERAL
PAINLEVEL_OUTOF10: 0 - NO PAIN
PAINLEVEL_OUTOF10: 0 - NO PAIN

## 2025-06-20 ASSESSMENT — PAIN - FUNCTIONAL ASSESSMENT
PAIN_FUNCTIONAL_ASSESSMENT: 0-10
PAIN_FUNCTIONAL_ASSESSMENT: 0-10

## 2025-06-20 ASSESSMENT — LIFESTYLE VARIABLES
HAVE YOU EVER FELT YOU SHOULD CUT DOWN ON YOUR DRINKING: NO
EVER HAD A DRINK FIRST THING IN THE MORNING TO STEADY YOUR NERVES TO GET RID OF A HANGOVER: NO
EVER FELT BAD OR GUILTY ABOUT YOUR DRINKING: NO
TOTAL SCORE: 0
HAVE PEOPLE ANNOYED YOU BY CRITICIZING YOUR DRINKING: NO

## 2025-06-20 NOTE — ED PROVIDER NOTES
HPI   Chief Complaint   Patient presents with    Headache       HPI  55-year-old female coming into the emergency department for evaluation of wheezing.  Initially brought in based on fall.  Reportedly got tripped up on the corner of a door frame and fell, has a small abrasion to the left tib-fib.  She says she is not worried about any of that, she says she really does not want to be here but they recommended her that she come in.  She says her only symptoms today are she is having some wheezing.  She has no injury or pain from the fall, not on blood thinning medications, she states that she is in no acute distress.  The patient is in no acute distress.      Patient History   Medical History[1]  Surgical History[2]  Family History[3]  Social History[4]    Physical Exam   ED Triage Vitals   Temp Pulse Resp BP   -- -- -- --      SpO2 Temp src Heart Rate Source Patient Position   -- -- -- --      BP Location FiO2 (%)     -- --       Physical Exam  GENERAL APPEARANCE: This patient is in no acute respiratory distress. Awake and alert.talking appropriately. Answering questions appropriately. No evidence of pressured speech     VITAL SIGNS: As per the nurses' triage record.     HEENT: Normocephalic, atraumatic.     NECK:  full gross ROM during exam    Pulmonary: Some wheezing particularly expiratory slightly worse on the left.    MUSCULOSKELETAL: Small 1 cm abrasion anterior left tib-fib, no bony tenderness full with full range of motion.  Full gross active range of motion. Ambulating on own with no acute difficulties.  The patient's left leg has a slightly different color than the right leg, not cyanotic, not pale, just seems to have slightly darker colored skin.  She says it has been that way for years and is not something new and does not want me to investigate that.     NEUROLOGICAL: Awake, alert and oriented x 3.    IMMUNOLOGICAL: No palpable lymphadenopathy or lymphatic streaking noted on visible skin.    DERM: No  petechiae, rashes, or ecchymoses. on visible skin    PSYCH: mood and affect appear normal.      ED Course & MDM   Diagnoses as of 06/20/25 1608   Wheezing   Abrasion of left lower extremity, initial encounter                 No data recorded     Pierson Coma Scale Score: 15 (06/20/25 1501 : Dee Allen, JACKY)                           Medical Decision Making  Parts of this chart have been completed using voice recognition software. Please excuse any errors of transcription.  My thought process and reason for plan has been formulated from the time that I saw the patient until the time of disposition and is not specific to one specific moment during their visit and furthermore my MDM encompasses this entire chart and not only this text box.      HPI: Detailed above.    Exam: A medically appropriate exam performed, outlined above, given the known history and presentation.    History Limited by: Nothing    History obtained from: Patient    External/internal records reviewed: No external record reviewed    Social Determinants of Health considered during this visit: Lives at    Chronic conditions impacting care: Denies    Medications given during visit:  Medications   ipratropium-albuteroL (Duo-Neb) 0.5-2.5 mg/3 mL nebulizer solution 3 mL (3 mL nebulization Given 6/20/25 1606)   ipratropium-albuteroL (Duo-Neb) 0.5-2.5 mg/3 mL nebulizer solution 3 mL (3 mL nebulization Given 6/20/25 1605)   predniSONE (Deltasone) tablet 60 mg (60 mg oral Given 6/20/25 1604)        Diagnostic/tests  Labs Reviewed - No data to display   XR chest 1 view   Final Result   No acute pulmonary pathology.   Signed by Beau Viera MD             Considerations/further MDM:  Differential includes pneumonia, acute asthma or COPD, pneumothorax, hemothorax, also considered fracture or dislocation, considered vascular compromise of the leg, considered laceration or injury that required closure, the patient really stated that she did not want a be  "here, states that she otherwise feels fine and just wants to go home.  The patient denies any other location of pain or injury.  Says that she is feeling better, she says that the breathing treatment and the steroids has helped her in the past, she thinks she has asthma but has never been formally diagnosed, she says that she will follow-up with her family doctor.  She did not want to have any other x-rays of injured areas or anything of that nature.  I have treated the patient to the best of my ability within the limitations for which the patient has provided as she seemed fairly specific on what she was willing to have done and not have done.  The patient feels comfortable home-going plan, return precautions follow-up instructions the ED discussed      Procedure  Procedures       [1]   Past Medical History:  Diagnosis Date    Stroke (Multi)    [2]   Past Surgical History:  Procedure Laterality Date    ABDOMINAL SURGERY      BRAIN SURGERY      CHOLECYSTECTOMY      COSMETIC SURGERY      FRACTURE SURGERY     [3] No family history on file.  [4]   Social History  Tobacco Use    Smoking status: Never    Smokeless tobacco: Never   Vaping Use    Vaping status: Never Used   Substance Use Topics    Alcohol use: Yes     Comment: pt says \"she drinks socially\"    Drug use: Never        Ottoniel Lindquist PA-C  06/20/25 [id number]    "

## 2025-06-20 NOTE — DISCHARGE INSTRUCTIONS
"Thank you for allowing us to take care of you today. While you are home, you might receive a survey about your care in our hospital. Your nurse and myself, Ottoniel Lindquist PA-C, would love your honest feedback on how we can improve your care. Your feedback and especially your positive comments help our hospital receive the support we need to continue to serve you and your family. Thank you again for trusting us with your care.\"    Be sure to follow up as directed in 1-2 days.  All of the details of your follow up instructions are detailed in the follow up section of this packet.       It is important to remember that your care does not end here and you must continue to monitor your condition closely. Please return to the emergency department for any worsening or concerning signs or symptoms as directed by our conversations and the discharge instructions. Otherwise please follow up with your doctor in 2 days if no better or worse. If you do not have a doctor please contact the referral number on your discharge instructions. Please contact any physician specialists provided in your discharge notes as it is very important to follow up with them regarding your condition. If you are unable to reach the physicians provided, please come back to the Emergency Department at any time.        Return to emergency room without delay for ANY new or worsening pains or for any other symptoms or concerns.      "

## 2025-07-01 ENCOUNTER — APPOINTMENT (OUTPATIENT)
Dept: PHYSICAL THERAPY | Facility: CLINIC | Age: 55
End: 2025-07-01
Payer: MEDICARE

## 2025-08-05 ENCOUNTER — EVALUATION (OUTPATIENT)
Dept: PHYSICAL THERAPY | Facility: CLINIC | Age: 55
End: 2025-08-05
Payer: MEDICARE

## 2025-08-05 DIAGNOSIS — R26.2 DIFFICULTY WALKING: ICD-10-CM

## 2025-08-05 DIAGNOSIS — I63.9 CEREBRAL INFARCTION: Primary | ICD-10-CM

## 2025-08-05 PROCEDURE — 97162 PT EVAL MOD COMPLEX 30 MIN: CPT | Mod: GP

## 2025-08-05 ASSESSMENT — PAIN - FUNCTIONAL ASSESSMENT: PAIN_FUNCTIONAL_ASSESSMENT: 0-10

## 2025-08-05 NOTE — LETTER
August 6, 2025    Gopi Moise DO  [address] [id number]    Patient: Carol Paredes   YOB: 1970   Date of Visit: 8/5/2025       Dear Gopi Moise DO  [address] [id number]    The attached plan of care is being sent to you because your patient’s medical reimbursement requires that you certify the plan of care. Your signature is required to allow uninterrupted insurance coverage.      You may indicate your approval by signing below and faxing this form back to us at Dept Fax: [phone].    Please call Dept: [phone] with any questions or concerns.    Thank you for this referral,        Maicol Nicole, PT  [facility] [address] [place] [address]    Payer: Payor: MEDICAL [facility] MEDICARE / Plan: [employer] [facility] MEDICARE / Product Type: *No Product type* /                                                                         Date:     Dear Maicol Nicole, PT,     Re: Ms. Mamta Paredes, MRN:[id number]    I certify that I have reviewed the attached plan of care and it is medically necessary for Ms. Mamta Paredes (1970) who is under my care.          ______________________________________                    _________________  Provider name and credentials                                           Date and time                                                                                           Plan of Care 8/5/25   Effective from: 8/5/2025  Effective to: 11/3/2025    Plan ID: [id number]            Participants as of Finalize on 8/6/2025    Name Type Comments Contact Info    Gopi Moise DO Referring Physician Outpatient PT evaluation completed.  Thank you for the referral [phone]    Maicol Nicole, PT Physical Therapist  [phone]       Last Plan Note     Author: Maicol Nicole, PT Status: Incomplete Last edited: 8/5/2025  2:00 PM           Physical Therapy  "Evaluation    Patient Name: Mamta Paredes \"Pal"  MRN: [id number]  Evaluation Date: 8/5/2025  Time Calculation  Start Time: 1415  Stop Time: 1445  Time Calculation (min): 30 min  PT Evaluation Time Entry  PT Evaluation (Moderate) Time Entry: 30       Problem List Items Addressed This Visit           ICD-10-CM    Difficulty walking R26.2    Relevant Orders    Follow Up In Physical Therapy     Other Visit Diagnoses         Codes      Cerebral infarction    -  Primary I63.9    Relevant Orders    Follow Up In Physical Therapy    Follow Up In Physical Therapy              Subjective  General:  General  Reason for Referral: Keny-plegia, difficulty walking, multiple falls with h/o CVA  Referred By: Gopi Moise DO  Past Medical History Relevant to Rehab: 54 y/o F who presents for PT evaluation with cc of multiple falls, general weakness, difficulty ambulating with h/o CVA in 2015.  Presents in W/C.  Reports walking short distances in home with walker but falling alot.  LLE paresis > R  Preferred Learning Style: verbal  General Comment: Limite exam as patient 15 minutes late    Surgery:   No    Precautions:  Precautions  Medical Precautions: Fall precautions  Precautions Comment: L knee rizwan    Relevant PMH:  CVA, vascular disease, HA, kidney disease, falls, HTN, R ankle FX, MCA-STA by-pass cholecystectomy, breast reduction    Red flags: No    Pain:  Pain Assessment: 0-10  Home Living:  Home Living Comment: Some intermittent assist from DTR and .    Prior Function Per Pt/Caregiver Report:  Level of [place]: Needs assistance with ADLs, Needs assistance with homemaking, Needs assistance with functional transfers  Ambulatory Assistance: Needs assistance  Gait: Minimal, Stand by (with 2WW, assist in/out of home)    OBJECTIVE:  Objective  Posture:  Posture Comment: Flexed posturing, valgus posturing L knee, equinus valgus L ankle  Range of Motion:  Range of Motion Comments: Gross LE AROM WFL with exception to L " foot drop with trace L DF 5-10 degrees 1/5  Strength:  Strength Comments: 3 to 3-/5 RLE, L hip 2+/5, L quad/HS 2 to 2-/5, L ankle/foot 1/5  Flexibility:  Flexibility Comment: Spastic 3+ L gastroc, 2+ L hamstring  Palpation:  Palpation Comment: Non-tender  Special Tests:  Special Tests Comment: Deferred  Gait:  Gait Comment: Min A to trunk and LLE in II bars wtih W/C follow 10 ' x 1  Balance:  Balance Comment: Poor, unable to initaite/sustain SLS LLE  Stairs:  Stairs Comment: N/A  Bed Mobility:  Bed Mobility Comment: N/A  Transfers:  Transfers Comment: min/mod A x 1 for sit to stand out of wheelchair  Other:  Comment: Standing endurance 3-5 minutes.  Impaired heel to shin LLE with notable dyskinesia present.  Impaired proprioception L hemibody    Outcome Measures:  Other Measures  Oswestry Disablity Index (YASIR): 86% impairment LEFS     Assessment  PT Assessment Results: Decreased strength, Decreased range of motion, Decreased mobility, Decreased coordination, Decreased endurance, Impaired balance  Rehab Prognosis: Fair  Evaluation/Treatment Tolerance: Patient limited by fatigue    Pt is a 55 y.o. female who presents with impairments of LE paresis, impaired coordination secondary h/o CVA. These impairments have led to functional limitations including impaired balance, endurance, ambulatory ability. Pt would benefit from skilled physical therapy intervention to improve above impairments and facilitate return to function.    Complexity of Evaluation: Moderate    Based on the history including personal factors and/or comorbidities, examination of body systems including body structures and function, activity limitations, and/or participation restrictions, as well as clinical presentation, patient meets criteria for above complexity evaluation.    Plan  Treatment/Interventions: Aquatic therapy, Cryotherapy, Dry needling, Education/ Instruction, Electrical stimulation, Gait training, Manual therapy, Neuromuscular  "re-education, Orthosis fit/ training, Prosthetic management/ training, Self care/ home management, Taping techniques, Therapeutic activities, Therapeutic exercises, Ultrasound  PT Plan: Skilled PT  PT Frequency: 2 times per week  Duration: 10-20 weeks  Certification Period Start Date: 08/05/25  Number of Treatments Authorized: No auth, [place]  Rehab Potential: Fair  Plan of Care Agreement: Patient    Insurance Plan: Payor: [employer]ICAL [employer] [facility] MEDICARE / Plan: [facility] [employer] [facility] MEDICARE / Product Type: *No Product type* /     Plan for next visit: Strengthening, balance, endurance in II bars.     OP EDUCATION:  Outpatient Education  Individual(s) Educated: Patient  Education Provided: POC  Risk and Benefits Discussed with Patient/Caregiver/Other: yes  Patient/Caregiver Demonstrated Understanding: yes  Plan of Care Discussed and Agreed Upon: yes  Patient Response to Education: Patient/Caregiver Verbalized Understanding of Information    Today's Treatment:  Evaluation and discussion only.  Exam limited in duration as pt. 15 minutes late today.   HEP to be completed daily, exercises include:  To be developed for seated core and LE strengthening.     Goals:  Active       PT Problem       STG's       Start:  08/05/25    Expected End:  09/19/25       STG 1: Patient will be IND with seated core and LE strengthening HEP for self management of condition         LTG's       Start:  08/05/25    Expected End:  11/03/25       LTG 1: patient will report < 50% impairment LEFS  LTG 2: patient will compelte 5 times STS exam in < 15 seconds as demonstration of improved mobility  LTG 3: Patient will complete TUG exam with LRD at mod I level, < 15 seconds  LTG 4: Patient will report 50% improvement in LE strength for improved standing endurance and general mobility.             PT Problem       Patient Stated Goal 1       Start:  08/06/25    Expected End:  11/03/25       \"I want to walk better\"                                Current " Participants as of 8/6/2025    Name Type Comments Contact Info    Gopi Moise, DO Referring Physician Outpatient PT evaluation completed.  Thank you for the referral [phone]    Signature pending    Maicol Nicole PT Physical Therapist  [phone]    Electronically signed by Maicol Nicole PT at 8/6/2025 1935 EDT

## 2025-08-06 ENCOUNTER — HOSPITAL ENCOUNTER (EMERGENCY)
Facility: HOSPITAL | Age: 55
Discharge: HOME | End: 2025-08-06
Payer: MEDICARE

## 2025-08-06 ENCOUNTER — APPOINTMENT (OUTPATIENT)
Dept: RADIOLOGY | Facility: HOSPITAL | Age: 55
End: 2025-08-06
Payer: MEDICARE

## 2025-08-06 VITALS
BODY MASS INDEX: 47.31 KG/M2 | HEART RATE: 63 BPM | RESPIRATION RATE: 19 BRPM | WEIGHT: 267 LBS | DIASTOLIC BLOOD PRESSURE: 59 MMHG | TEMPERATURE: 98.1 F | HEIGHT: 63 IN | OXYGEN SATURATION: 94 % | SYSTOLIC BLOOD PRESSURE: 121 MMHG
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
## 2025-08-06 DIAGNOSIS — M54.50 ACUTE BILATERAL LOW BACK PAIN WITHOUT SCIATICA: Primary | ICD-10-CM

## 2025-08-06 PROBLEM — R26.2 DIFFICULTY WALKING: Status: ACTIVE | Noted: 2025-08-06

## 2025-08-06 PROCEDURE — 2500000001 HC RX 250 WO HCPCS SELF ADMINISTERED DRUGS (ALT 637 FOR MEDICARE OP)

## 2025-08-06 PROCEDURE — 72100 X-RAY EXAM L-S SPINE 2/3 VWS: CPT | Performed by: RADIOLOGY

## 2025-08-06 PROCEDURE — 99283 EMERGENCY DEPT VISIT LOW MDM: CPT

## 2025-08-06 PROCEDURE — 72100 X-RAY EXAM L-S SPINE 2/3 VWS: CPT

## 2025-08-06 PROCEDURE — 2500000005 HC RX 250 GENERAL PHARMACY W/O HCPCS

## 2025-08-06 RX ORDER — ACETAMINOPHEN 325 MG/1
975 TABLET ORAL ONCE
Status: COMPLETED | OUTPATIENT
Start: 2025-08-06 | End: 2025-08-06

## 2025-08-06 RX ORDER — LIDOCAINE 560 MG/1
1 PATCH PERCUTANEOUS; TOPICAL; TRANSDERMAL ONCE
Status: DISCONTINUED | OUTPATIENT
Start: 2025-08-06 | End: 2025-08-06 | Stop reason: HOSPADM

## 2025-08-06 RX ADMIN — LIDOCAINE 1 PATCH: 4 PATCH TOPICAL at 17:18

## 2025-08-06 RX ADMIN — ACETAMINOPHEN 975 MG: 325 TABLET ORAL at 17:16

## 2025-08-06 ASSESSMENT — PAIN DESCRIPTION - PAIN TYPE: TYPE: ACUTE PAIN

## 2025-08-06 ASSESSMENT — PAIN - FUNCTIONAL ASSESSMENT: PAIN_FUNCTIONAL_ASSESSMENT: 0-10

## 2025-08-06 ASSESSMENT — PAIN SCALES - GENERAL: PAINLEVEL_OUTOF10: 2

## 2025-08-06 ASSESSMENT — PAIN DESCRIPTION - PROGRESSION: CLINICAL_PROGRESSION: NOT CHANGED

## 2025-08-06 ASSESSMENT — PAIN DESCRIPTION - LOCATION: LOCATION: BACK

## 2025-08-06 ASSESSMENT — PAIN DESCRIPTION - FREQUENCY: FREQUENCY: CONSTANT/CONTINUOUS

## 2025-08-06 ASSESSMENT — PAIN DESCRIPTION - ORIENTATION: ORIENTATION: LOWER

## 2025-08-06 ASSESSMENT — PAIN DESCRIPTION - DESCRIPTORS: DESCRIPTORS: ACHING

## 2025-08-06 NOTE — DISCHARGE INSTRUCTIONS
Thank you for choosing [facility] and [facility] for your care today. Please follow up as indicated as continued care and treatment is a very important part of your care today. Please return to this or any Emergency Department if you have any new or worsening symptoms.

## 2025-08-06 NOTE — ED PROVIDER NOTES
HPI   Chief Complaint   Patient presents with    Fall     Patient presents to ED for fall, patient hit back and now has back pain, patient states she broke her ankle last year and now it gives out from under her. Denies hitting head, denies LOC, no blood thinners       Patient is a 55-year-old female presenting to the emergency department for evaluation of back pain after a fall.  Patient states she lost her balance and fell backwards hitting her lower back on her wheelchair.  She states she did not hit her head and did not lose consciousness.  Denies any numbness or tingling in the legs.  Denies any pain in the upper or lower extremities.  Denies any chest pain, shortness of breath, fevers, chills, nausea, vomiting, abdominal pain.  Denies any loss of bowel or bladder function.              Patient History   Medical History[1]  Surgical History[2]  Family History[3]  Social History[4]    Physical Exam   ED Triage Vitals [08/06/25 1642]   Temperature Heart Rate Respirations BP   36.8 °C (98.2 °F) 81 15 130/51      Pulse Ox Temp Source Heart Rate Source Patient Position   96 % Temporal Monitor Sitting      BP Location FiO2 (%)     Left arm --       Physical Exam  Vitals and nursing note reviewed.   Constitutional:       General: She is not in acute distress.     Appearance: Normal appearance. She is not ill-appearing or toxic-appearing.   HENT:      Head: Normocephalic and atraumatic.      Nose: Nose normal.      Mouth/Throat:      Mouth: Mucous membranes are moist.     Eyes:      Extraocular Movements: Extraocular movements intact.      Pupils: Pupils are equal, round, and reactive to light.       Cardiovascular:      Rate and Rhythm: Normal rate and regular rhythm.      Pulses: Normal pulses.      Heart sounds: Normal heart sounds.   Pulmonary:      Effort: Pulmonary effort is normal.      Breath sounds: Normal breath sounds. No wheezing, rhonchi or rales.   Abdominal:      Palpations: Abdomen is soft.       Tenderness: There is no abdominal tenderness. There is no guarding or rebound.     Musculoskeletal:         General: No tenderness (No significant midline tenderness down the TLS spine with no palpable step-offs or deformities). Normal range of motion.      Cervical back: Normal range of motion. No tenderness.     Skin:     General: Skin is warm and dry.     Neurological:      General: No focal deficit present.      Mental Status: She is alert and oriented to person, place, and time.      Sensory: No sensory deficit.      Motor: No weakness.      Comments: Normal Sensation in the bilateral inner thighs, lower legs, and feet  5/5 strength bilateral abduction/adduction  5/5 strength bilateral flexion and extension of knee  5/5 strength bilateral dorsiflexion and plantar flexion of ankle   5/5 strength plantarflexion of great toes bilaterally  2+ bilateral knee reflexes   Psychiatric:         Mood and Affect: Mood normal.         Behavior: Behavior normal.           ED Course & MDM   Diagnoses as of 08/06/25 1806   Acute bilateral low back pain without sciatica   Fall from standing, initial encounter                 No data recorded     [place] Coma Scale Score: 15 (08/06/25 1637 : Zoe Morales RN)                           Medical Decision Making  **Disclaimer parts of this chart have been completed using voice recognition software. Please excuse any errors of transcription.     Evaluated this patient independently and my supervising physician was available for consultation.    HPI: Detailed above.    Exam: A medically appropriate exam performed, outlined above, given the known history and presentation.    History obtained from: Patient    Labs/Diagnostics:  XR lumbar spine 2-3 views   Final Result   No displaced fracture is apparent. Please see above discussion. If   further imaging assessment is clinically warranted, recommend CT or   MRI.             MACRO:   None        Signed by: Timur Zuñiga 8/6/2025  "5:44 PM   Dictation workstation:   [id number]        EMERGENCY DEPARTMENT COURSE and DIFFERENTIAL DIAGNOSIS/MDM:  Patient is a 55-year-old female presenting to the emergency department for evaluation of low back pain after a fall.  On physical exam vital signs stable patient is in no acute distress.  Patient has no significant midline tenderness down the TLS spine with no palpable step-offs or deformities.  Patient has full range of motion of the bilateral upper and lower extremities with no pain.  Normal neurosensory exam.  Did not hit her head and did not lose consciousness therefore do not feel that CT of the head or neck is necessary.  X-ray of the lumbar spine ordered.  X-ray showed no acute abnormalities.  Given that patient does not have any significant midline tenderness and states that it actually felt good when I was pressing on her back do not feel that any further imaging is warranted.  Patient discharged in stable condition.  She was advised to follow-up with primary care physician outpatient.  She will return to the emergency department with any new or worsening symptoms.     Based on patient history and exam, low red flag score and normal neuro exam of all lumbar and sacral nerve distributions  I have considered but suspect there is a very low risk for cauda equina syndrome, epidural abscess or hematoma, mass lesion, or other infectious process such as discitis, osteomyelitis or transverse myelitis, severe spinal stenosis, or emergent back pathology. Given low risk while I have considered  advanced imaging such as MRI or CT of the spine, but given low risk these can be deferred at this time and patient can be managed symptomatically.      Vitals:    Vitals:    08/06/25 1642   BP: 130/51   BP Location: Left arm   Patient Position: Sitting   Pulse: 81   Resp: 15   Temp: 36.8 °C (98.2 °F)   TempSrc: Temporal   SpO2: 96%   Weight: 121 kg (267 lb)   Height: 1.6 m (5' 3\")     History Limited " "by:    None    Independent history obtained from:    None    External records reviewed:    None    Diagnostics interpreted by me:    Xrays - see my independent interpretation in MDM    Discussions with other clinicians:    None    Chronic conditions impacting care:    None    Social determinants of health affecting care:    None    Diagnostic tests considered but not performed: None    ED Medications managed:    Medications   lidocaine 4 % patch 1 patch (1 patch transdermal Medication Applied 8/6/25 1718)   acetaminophen (Tylenol) tablet 975 mg (975 mg oral Given 8/6/25 1716)       Prescription drugs considered:    None    Screenings:              Procedure  Procedures         [1]   Past Medical History:  Diagnosis Date    Stroke (Multi)    [2]   Past Surgical History:  Procedure Laterality Date    ABDOMINAL SURGERY      BRAIN SURGERY      CHOLECYSTECTOMY      COSMETIC SURGERY      FRACTURE SURGERY     [3] No family history on file.  [4]   Social History  Tobacco Use    Smoking status: Never    Smokeless tobacco: Never   Vaping Use    Vaping status: Never Used   Substance Use Topics    Alcohol use: Yes     Comment: pt says \"she drinks socially\"    Drug use: Never        Cheyenne Ortiz PA-C  08/06/25 6819    "

## 2025-08-06 NOTE — PROGRESS NOTES
"    Physical Therapy Evaluation    Patient Name: Mamta Paredes \"Pal"  MRN: [id number]  Evaluation Date: 8/5/2025  Time Calculation  Start Time: 1415  Stop Time: 1445  Time Calculation (min): 30 min  PT Evaluation Time Entry  PT Evaluation (Moderate) Time Entry: 30       Problem List Items Addressed This Visit           ICD-10-CM    Difficulty walking R26.2    Relevant Orders    Follow Up In Physical Therapy     Other Visit Diagnoses         Codes      Cerebral infarction    -  Primary I63.9    Relevant Orders    Follow Up In Physical Therapy    Follow Up In Physical Therapy              Subjective   General:  General  Reason for Referral: Keny-plegia, difficulty walking, multiple falls with h/o CVA  Referred By: Gopi Moise DO  Past Medical History Relevant to Rehab: 56 y/o F who presents for PT evaluation with cc of multiple falls, general weakness, difficulty ambulating with h/o CVA in 2015.  Presents in W/C.  Reports walking short distances in home with walker but falling alot.  LLE paresis > R  Preferred Learning Style: verbal  General Comment: Limite exam as patient 15 minutes late    Surgery:   No    Precautions:  Precautions  Medical Precautions: Fall precautions  Precautions Comment: L knee rizwan    Relevant PMH:  CVA, vascular disease, HA, kidney disease, falls, HTN, R ankle FX, MCA-STA by-pass cholecystectomy, breast reduction    Red flags: No    Pain:  Pain Assessment: 0-10  Home Living:  Home Living Comment: Some intermittent assist from DTR and .    Prior Function Per Pt/Caregiver Report:  Level of [place]: Needs assistance with ADLs, Needs assistance with homemaking, Needs assistance with functional transfers  Ambulatory Assistance: Needs assistance  Gait: Minimal, Stand by (with 2WW, assist in/out of home)    OBJECTIVE:  Objective   Posture:  Posture Comment: Flexed posturing, valgus posturing L knee, equinus valgus L ankle  Range of Motion:  Range of Motion Comments: Gross LE AROM " WFL with exception to L foot drop with trace L DF 5-10 degrees 1/5  Strength:  Strength Comments: 3 to 3-/5 RLE, L hip 2+/5, L quad/HS 2 to 2-/5, L ankle/foot 1/5  Flexibility:  Flexibility Comment: Spastic 3+ L gastroc, 2+ L hamstring  Palpation:  Palpation Comment: Non-tender  Special Tests:  Special Tests Comment: Deferred  Gait:  Gait Comment: Min A to trunk and LLE in II bars wtih W/C follow 10 ' x 1  Balance:  Balance Comment: Poor, unable to initaite/sustain SLS LLE  Stairs:  Stairs Comment: N/A  Bed Mobility:  Bed Mobility Comment: N/A  Transfers:  Transfers Comment: min/mod A x 1 for sit to stand out of wheelchair  Other:  Comment: Standing endurance 3-5 minutes.  Impaired heel to shin LLE with notable dyskinesia present.  Impaired proprioception L hemibody    Outcome Measures:  Other Measures  Oswestry Disablity Index (YASIR): 86% impairment LEFS     Assessment  PT Assessment Results: Decreased strength, Decreased range of motion, Decreased mobility, Decreased coordination, Decreased endurance, Impaired balance  Rehab Prognosis: Fair  Evaluation/Treatment Tolerance: Patient limited by fatigue    Pt is a 55 y.o. female who presents with impairments of LE paresis, impaired coordination secondary h/o CVA. These impairments have led to functional limitations including impaired balance, endurance, ambulatory ability. Pt would benefit from skilled physical therapy intervention to improve above impairments and facilitate return to function.    Complexity of Evaluation: Moderate    Based on the history including personal factors and/or comorbidities, examination of body systems including body structures and function, activity limitations, and/or participation restrictions, as well as clinical presentation, patient meets criteria for above complexity evaluation.    Plan  Treatment/Interventions: Aquatic therapy, Cryotherapy, Dry needling, Education/ Instruction, Electrical stimulation, Gait training, Manual therapy,  "Neuromuscular re-education, Orthosis fit/ training, Prosthetic management/ training, Self care/ home management, Taping techniques, Therapeutic activities, Therapeutic exercises, Ultrasound  PT Plan: Skilled PT  PT Frequency: 2 times per week  Duration: 10-20 weeks  Certification Period Start Date: 08/05/25  Number of Treatments Authorized: No auth, [place]  Rehab Potential: Fair  Plan of Care Agreement: Patient    Insurance Plan: Payor: [facility] MEDICARE / Plan: [facility] MEDICARE / Product Type: *No Product type* /     Plan for next visit: Strengthening, balance, endurance in II bars.     OP EDUCATION:  Outpatient Education  Individual(s) Educated: Patient  Education Provided: POC  Risk and Benefits Discussed with Patient/Caregiver/Other: yes  Patient/Caregiver Demonstrated Understanding: yes  Plan of Care Discussed and Agreed Upon: yes  Patient Response to Education: Patient/Caregiver Verbalized Understanding of Information    Today's Treatment:  Evaluation and discussion only.  Exam limited in duration as pt. 15 minutes late today.   HEP to be completed daily, exercises include:  To be developed for seated core and LE strengthening.     Goals:  Active       PT Problem       STG's       Start:  08/05/25    Expected End:  09/19/25       STG 1: Patient will be IND with seated core and LE strengthening HEP for self management of condition         LTG's       Start:  08/05/25    Expected End:  11/03/25       LTG 1: patient will report < 50% impairment LEFS  LTG 2: patient will compelte 5 times STS exam in < 15 seconds as demonstration of improved mobility  LTG 3: Patient will complete TUG exam with LRD at mod I level, < 15 seconds  LTG 4: Patient will report 50% improvement in LE strength for improved standing endurance and general mobility.             PT Problem       Patient Stated Goal 1       Start:  08/06/25    Expected End:  11/03/25       \"I want to walk better\"                         "

## 2025-08-21 ENCOUNTER — APPOINTMENT (OUTPATIENT)
Dept: RADIOLOGY | Facility: HOSPITAL | Age: 55
End: 2025-08-21
Payer: MEDICARE

## 2025-08-21 ENCOUNTER — HOSPITAL ENCOUNTER (OUTPATIENT)
Facility: HOSPITAL | Age: 55
Setting detail: OBSERVATION
Discharge: NURSING, REHAB OR LONG-TERM CARE | End: 2025-08-21
Attending: STUDENT IN AN ORGANIZED HEALTH CARE EDUCATION/TRAINING PROGRAM | Admitting: STUDENT IN AN ORGANIZED HEALTH CARE EDUCATION/TRAINING PROGRAM
Payer: MEDICARE

## 2025-08-21 DIAGNOSIS — R26.2 DIFFICULTY IN WALKING: ICD-10-CM
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
## 2025-08-21 DIAGNOSIS — S93.104A: Primary | ICD-10-CM

## 2025-08-21 DIAGNOSIS — E72.11 HOMOCYSTINURIA (MULTI): ICD-10-CM

## 2025-08-21 DIAGNOSIS — S91.109A: Primary | ICD-10-CM

## 2025-08-21 DIAGNOSIS — I25.10 CORONARY ARTERY DISEASE INVOLVING NATIVE CORONARY ARTERY OF NATIVE HEART WITHOUT ANGINA PECTORIS: ICD-10-CM

## 2025-08-21 PROBLEM — S99.921A TOE INJURY, RIGHT, INITIAL ENCOUNTER: Status: ACTIVE | Noted: 2025-08-21

## 2025-08-21 PROBLEM — I69.398 GAIT DISTURBANCE, POST-STROKE: Status: ACTIVE | Noted: 2020-04-06

## 2025-08-21 PROBLEM — N18.30 STAGE 3 CHRONIC KIDNEY DISEASE (MULTI): Status: ACTIVE | Noted: 2024-03-04

## 2025-08-21 PROBLEM — I50.22 CHRONIC HFREF (HEART FAILURE WITH REDUCED EJECTION FRACTION): Status: ACTIVE | Noted: 2024-02-24

## 2025-08-21 PROBLEM — R26.9 GAIT DISTURBANCE, POST-STROKE: Status: ACTIVE | Noted: 2020-04-06

## 2025-08-21 PROBLEM — J45.909 ASTHMA: Status: ACTIVE | Noted: 2024-02-29

## 2025-08-21 LAB
ANION GAP SERPL CALCULATED.3IONS-SCNC: 9 MMOL/L (ref 10–20)
BASOPHILS # BLD AUTO: 0.04 X10*3/UL (ref 0–0.1)
BASOPHILS NFR BLD AUTO: 0.4 %
BUN SERPL-MCNC: 12 MG/DL (ref 6–23)
CALCIUM SERPL-MCNC: 8.7 MG/DL (ref 8.6–10.3)
CHLORIDE SERPL-SCNC: 106 MMOL/L (ref 98–107)
CO2 SERPL-SCNC: 29 MMOL/L (ref 21–32)
CREAT SERPL-MCNC: 0.78 MG/DL (ref 0.5–1.05)
EGFRCR SERPLBLD CKD-EPI 2021: 90 ML/MIN/1.73M*2
EOSINOPHIL # BLD AUTO: 0.19 X10*3/UL (ref 0–0.7)
EOSINOPHIL NFR BLD AUTO: 1.9 %
ERYTHROCYTE [DISTWIDTH] IN BLOOD BY AUTOMATED COUNT: 15.4 % (ref 11.5–14.5)
GLUCOSE SERPL-MCNC: 96 MG/DL (ref 74–99)
HCT VFR BLD AUTO: 39.7 % (ref 36–46)
HGB BLD-MCNC: 12.9 G/DL (ref 12–16)
IMM GRANULOCYTES # BLD AUTO: 0.02 X10*3/UL (ref 0–0.7)
IMM GRANULOCYTES NFR BLD AUTO: 0.2 % (ref 0–0.9)
LYMPHOCYTES # BLD AUTO: 3.44 X10*3/UL (ref 1.2–4.8)
LYMPHOCYTES NFR BLD AUTO: 34.7 %
MCH RBC QN AUTO: 29.4 PG (ref 26–34)
MCHC RBC AUTO-ENTMCNC: 32.5 G/DL (ref 32–36)
MCV RBC AUTO: 90 FL (ref 80–100)
MONOCYTES # BLD AUTO: 0.81 X10*3/UL (ref 0.1–1)
MONOCYTES NFR BLD AUTO: 8.2 %
NEUTROPHILS # BLD AUTO: 5.41 X10*3/UL (ref 1.2–7.7)
NEUTROPHILS NFR BLD AUTO: 54.6 %
NRBC BLD-RTO: 0 /100 WBCS (ref 0–0)
PLATELET # BLD AUTO: 292 X10*3/UL (ref 150–450)
POTASSIUM SERPL-SCNC: 3.5 MMOL/L (ref 3.5–5.3)
RBC # BLD AUTO: 4.39 X10*6/UL (ref 4–5.2)
SODIUM SERPL-SCNC: 140 MMOL/L (ref 136–145)
WBC # BLD AUTO: 9.9 X10*3/UL (ref 4.4–11.3)

## 2025-08-21 PROCEDURE — 2500000005 HC RX 250 GENERAL PHARMACY W/O HCPCS: Performed by: STUDENT IN AN ORGANIZED HEALTH CARE EDUCATION/TRAINING PROGRAM

## 2025-08-21 PROCEDURE — 28660 TREAT TOE DISLOCATION: CPT | Mod: T9,59

## 2025-08-21 PROCEDURE — 73660 X-RAY EXAM OF TOE(S): CPT | Mod: RT

## 2025-08-21 PROCEDURE — 28660 TREAT TOE DISLOCATION: CPT | Mod: T9

## 2025-08-21 PROCEDURE — G0378 HOSPITAL OBSERVATION PER HR: HCPCS

## 2025-08-21 PROCEDURE — 80048 BASIC METABOLIC PNL TOTAL CA: CPT | Performed by: STUDENT IN AN ORGANIZED HEALTH CARE EDUCATION/TRAINING PROGRAM

## 2025-08-21 PROCEDURE — 2500000004 HC RX 250 GENERAL PHARMACY W/ HCPCS (ALT 636 FOR OP/ED): Performed by: STUDENT IN AN ORGANIZED HEALTH CARE EDUCATION/TRAINING PROGRAM

## 2025-08-21 PROCEDURE — 85025 COMPLETE CBC W/AUTO DIFF WBC: CPT | Performed by: STUDENT IN AN ORGANIZED HEALTH CARE EDUCATION/TRAINING PROGRAM

## 2025-08-21 PROCEDURE — 73630 X-RAY EXAM OF FOOT: CPT | Mod: RIGHT SIDE | Performed by: RADIOLOGY

## 2025-08-21 PROCEDURE — 73630 X-RAY EXAM OF FOOT: CPT | Mod: RT

## 2025-08-21 PROCEDURE — 36415 COLL VENOUS BLD VENIPUNCTURE: CPT | Performed by: STUDENT IN AN ORGANIZED HEALTH CARE EDUCATION/TRAINING PROGRAM

## 2025-08-21 PROCEDURE — 99223 1ST HOSP IP/OBS HIGH 75: CPT | Performed by: STUDENT IN AN ORGANIZED HEALTH CARE EDUCATION/TRAINING PROGRAM

## 2025-08-21 PROCEDURE — 12001 RPR S/N/AX/GEN/TRNK 2.5CM/<: CPT | Performed by: STUDENT IN AN ORGANIZED HEALTH CARE EDUCATION/TRAINING PROGRAM

## 2025-08-21 PROCEDURE — 2500000001 HC RX 250 WO HCPCS SELF ADMINISTERED DRUGS (ALT 637 FOR MEDICARE OP): Performed by: STUDENT IN AN ORGANIZED HEALTH CARE EDUCATION/TRAINING PROGRAM

## 2025-08-21 PROCEDURE — 99285 EMERGENCY DEPT VISIT HI MDM: CPT | Mod: 25 | Performed by: STUDENT IN AN ORGANIZED HEALTH CARE EDUCATION/TRAINING PROGRAM

## 2025-08-21 PROCEDURE — 73630 X-RAY EXAM OF FOOT: CPT | Mod: 59,RT

## 2025-08-21 PROCEDURE — 96372 THER/PROPH/DIAG INJ SC/IM: CPT | Mod: 59 | Performed by: STUDENT IN AN ORGANIZED HEALTH CARE EDUCATION/TRAINING PROGRAM

## 2025-08-21 RX ORDER — DULOXETIN HYDROCHLORIDE 60 MG/1
60 CAPSULE, DELAYED RELEASE ORAL DAILY
Status: DISCONTINUED | OUTPATIENT
Start: 2025-08-22 | End: 2025-08-25 | Stop reason: HOSPADM

## 2025-08-21 RX ORDER — BISMUTH SUBSALICYLATE 262 MG
1 TABLET,CHEWABLE ORAL DAILY
COMMUNITY

## 2025-08-21 RX ORDER — POLYETHYLENE GLYCOL 3350 17 G/17G
17 POWDER, FOR SOLUTION ORAL DAILY
Status: DISCONTINUED | OUTPATIENT
Start: 2025-08-21 | End: 2025-08-25 | Stop reason: HOSPADM

## 2025-08-21 RX ORDER — TALC
6 POWDER (GRAM) TOPICAL NIGHTLY
Status: DISCONTINUED | OUTPATIENT
Start: 2025-08-21 | End: 2025-08-24

## 2025-08-21 RX ORDER — LISINOPRIL 10 MG/1
10 TABLET ORAL NIGHTLY
Status: DISCONTINUED | OUTPATIENT
Start: 2025-08-21 | End: 2025-08-25 | Stop reason: HOSPADM

## 2025-08-21 RX ORDER — ACETAMINOPHEN 650 MG/1
650 SUPPOSITORY RECTAL EVERY 4 HOURS PRN
Status: DISCONTINUED | OUTPATIENT
Start: 2025-08-21 | End: 2025-08-25 | Stop reason: HOSPADM

## 2025-08-21 RX ORDER — CEPHALEXIN 500 MG/1
500 CAPSULE ORAL 4 TIMES DAILY
Qty: 28 CAPSULE | Refills: 0 | Status: SHIPPED | OUTPATIENT
Start: 2025-08-21 | End: 2025-08-28

## 2025-08-21 RX ORDER — ATORVASTATIN CALCIUM 80 MG/1
80 TABLET, FILM COATED ORAL DAILY
Status: DISCONTINUED | OUTPATIENT
Start: 2025-08-22 | End: 2025-08-25 | Stop reason: HOSPADM

## 2025-08-21 RX ORDER — HEPARIN SODIUM 5000 [USP'U]/ML
7500 INJECTION, SOLUTION INTRAVENOUS; SUBCUTANEOUS EVERY 8 HOURS SCHEDULED
Status: DISCONTINUED | OUTPATIENT
Start: 2025-08-21 | End: 2025-08-25 | Stop reason: HOSPADM

## 2025-08-21 RX ORDER — ALBUTEROL SULFATE 0.83 MG/ML
3 SOLUTION RESPIRATORY (INHALATION) EVERY 6 HOURS PRN
Status: DISCONTINUED | OUTPATIENT
Start: 2025-08-21 | End: 2025-08-21 | Stop reason: SDUPTHER

## 2025-08-21 RX ORDER — LIDOCAINE HYDROCHLORIDE 10 MG/ML
10 INJECTION, SOLUTION INFILTRATION; PERINEURAL ONCE
Status: COMPLETED | OUTPATIENT
Start: 2025-08-21 | End: 2025-08-21

## 2025-08-21 RX ORDER — ALBUTEROL SULFATE 0.83 MG/ML
3 SOLUTION RESPIRATORY (INHALATION) EVERY 6 HOURS PRN
Status: DISCONTINUED | OUTPATIENT
Start: 2025-08-21 | End: 2025-08-25 | Stop reason: HOSPADM

## 2025-08-21 RX ORDER — SPIRONOLACTONE 25 MG/1
12.5 TABLET ORAL DAILY
Status: DISCONTINUED | OUTPATIENT
Start: 2025-08-22 | End: 2025-08-25 | Stop reason: HOSPADM

## 2025-08-21 RX ORDER — CARVEDILOL 12.5 MG/1
12.5 TABLET ORAL 2 TIMES DAILY
Status: DISCONTINUED | OUTPATIENT
Start: 2025-08-21 | End: 2025-08-22

## 2025-08-21 RX ORDER — ACETAMINOPHEN 325 MG/1
650 TABLET ORAL EVERY 4 HOURS PRN
Status: DISCONTINUED | OUTPATIENT
Start: 2025-08-21 | End: 2025-08-25 | Stop reason: HOSPADM

## 2025-08-21 RX ORDER — LIDOCAINE HYDROCHLORIDE 10 MG/ML
20 INJECTION, SOLUTION EPIDURAL; INFILTRATION; INTRACAUDAL; PERINEURAL ONCE
Status: DISCONTINUED | OUTPATIENT
Start: 2025-08-21 | End: 2025-08-21 | Stop reason: SDUPTHER

## 2025-08-21 RX ORDER — ACETAMINOPHEN 500 MG
1000 TABLET ORAL ONCE
Status: COMPLETED | OUTPATIENT
Start: 2025-08-21 | End: 2025-08-21

## 2025-08-21 RX ORDER — LAMOTRIGINE 100 MG/1
100 TABLET ORAL DAILY
Status: DISCONTINUED | OUTPATIENT
Start: 2025-08-22 | End: 2025-08-25 | Stop reason: HOSPADM

## 2025-08-21 RX ORDER — BUSPIRONE HYDROCHLORIDE 10 MG/1
10 TABLET ORAL 2 TIMES DAILY
Status: DISCONTINUED | OUTPATIENT
Start: 2025-08-21 | End: 2025-08-25 | Stop reason: HOSPADM

## 2025-08-21 RX ORDER — CEPHALEXIN 500 MG/1
500 CAPSULE ORAL ONCE
Status: COMPLETED | OUTPATIENT
Start: 2025-08-21 | End: 2025-08-21

## 2025-08-21 RX ORDER — LIDOCAINE HYDROCHLORIDE 10 MG/ML
10 INJECTION, SOLUTION INFILTRATION; PERINEURAL ONCE
Status: DISCONTINUED | OUTPATIENT
Start: 2025-08-21 | End: 2025-08-25 | Stop reason: HOSPADM

## 2025-08-21 RX ORDER — ACETAMINOPHEN 160 MG/5ML
650 SOLUTION ORAL EVERY 4 HOURS PRN
Status: DISCONTINUED | OUTPATIENT
Start: 2025-08-21 | End: 2025-08-25 | Stop reason: HOSPADM

## 2025-08-21 RX ORDER — EZETIMIBE 10 MG/1
10 TABLET ORAL DAILY
Status: DISCONTINUED | OUTPATIENT
Start: 2025-08-22 | End: 2025-08-25 | Stop reason: HOSPADM

## 2025-08-21 RX ORDER — HYDROXYZINE HYDROCHLORIDE 25 MG/1
25 TABLET, FILM COATED ORAL EVERY 6 HOURS PRN
Status: DISCONTINUED | OUTPATIENT
Start: 2025-08-21 | End: 2025-08-25 | Stop reason: HOSPADM

## 2025-08-21 RX ADMIN — LIDOCAINE HYDROCHLORIDE 10 ML: 10 INJECTION, SOLUTION INFILTRATION; PERINEURAL at 15:38

## 2025-08-21 RX ADMIN — ACETAMINOPHEN 1000 MG: 500 TABLET ORAL at 16:05

## 2025-08-21 RX ADMIN — CARVEDILOL 12.5 MG: 12.5 TABLET, FILM COATED ORAL at 22:36

## 2025-08-21 RX ADMIN — BUSPIRONE HYDROCHLORIDE 10 MG: 10 TABLET ORAL at 22:29

## 2025-08-21 RX ADMIN — CEPHALEXIN 500 MG: 500 CAPSULE ORAL at 16:00

## 2025-08-21 RX ADMIN — Medication 6 MG: at 22:28

## 2025-08-21 RX ADMIN — HEPARIN SODIUM 7500 UNITS: 5000 INJECTION INTRAVENOUS; SUBCUTANEOUS at 22:29

## 2025-08-21 SDOH — SOCIAL STABILITY: SOCIAL INSECURITY: WITHIN THE LAST YEAR, HAVE YOU BEEN HUMILIATED OR EMOTIONALLY ABUSED IN OTHER WAYS BY YOUR PARTNER OR EX-PARTNER?: NO

## 2025-08-21 SDOH — ECONOMIC STABILITY: FOOD INSECURITY: HOW HARD IS IT FOR YOU TO PAY FOR THE VERY BASICS LIKE FOOD, HOUSING, MEDICAL CARE, AND HEATING?: NOT HARD AT ALL

## 2025-08-21 SDOH — SOCIAL STABILITY: SOCIAL NETWORK: HOW OFTEN DO YOU GET TOGETHER WITH FRIENDS OR RELATIVES?: MORE THAN THREE TIMES A WEEK

## 2025-08-21 SDOH — SOCIAL STABILITY: SOCIAL INSECURITY: ARE YOU MARRIED, WIDOWED, DIVORCED, SEPARATED, NEVER MARRIED, OR LIVING WITH A PARTNER?: MARRIED

## 2025-08-21 SDOH — SOCIAL STABILITY: SOCIAL NETWORK
DO YOU BELONG TO ANY CLUBS OR ORGANIZATIONS SUCH AS CHURCH GROUPS, UNIONS, FRATERNAL OR ATHLETIC GROUPS, OR SCHOOL GROUPS?: YES

## 2025-08-21 SDOH — ECONOMIC STABILITY: HOUSING INSECURITY: IN THE LAST 12 MONTHS, WAS THERE A TIME WHEN YOU WERE NOT ABLE TO PAY THE MORTGAGE OR RENT ON TIME?: NO

## 2025-08-21 SDOH — HEALTH STABILITY: MENTAL HEALTH
DO YOU FEEL STRESS - TENSE, RESTLESS, NERVOUS, OR ANXIOUS, OR UNABLE TO SLEEP AT NIGHT BECAUSE YOUR MIND IS TROUBLED ALL THE TIME - THESE DAYS?: TO SOME EXTENT

## 2025-08-21 SDOH — HEALTH STABILITY: PHYSICAL HEALTH: ON AVERAGE, HOW MANY DAYS PER WEEK DO YOU ENGAGE IN MODERATE TO STRENUOUS EXERCISE (LIKE A BRISK WALK)?: 0 DAYS

## 2025-08-21 SDOH — SOCIAL STABILITY: SOCIAL INSECURITY: DO YOU FEEL ANYONE HAS EXPLOITED OR TAKEN ADVANTAGE OF YOU FINANCIALLY OR OF YOUR PERSONAL PROPERTY?: NO

## 2025-08-21 SDOH — ECONOMIC STABILITY: FOOD INSECURITY: WITHIN THE PAST 12 MONTHS, THE FOOD YOU BOUGHT JUST DIDN'T LAST AND YOU DIDN'T HAVE MONEY TO GET MORE.: NEVER TRUE

## 2025-08-21 SDOH — ECONOMIC STABILITY: FOOD INSECURITY: WITHIN THE PAST 12 MONTHS, YOU WORRIED THAT YOUR FOOD WOULD RUN OUT BEFORE YOU GOT THE MONEY TO BUY MORE.: NEVER TRUE

## 2025-08-21 SDOH — ECONOMIC STABILITY: HOUSING INSECURITY: AT ANY TIME IN THE PAST 12 MONTHS, WERE YOU HOMELESS OR LIVING IN A SHELTER (INCLUDING NOW)?: NO

## 2025-08-21 SDOH — SOCIAL STABILITY: SOCIAL NETWORK: HOW OFTEN DO YOU ATTEND CHURCH OR RELIGIOUS SERVICES?: 1 TO 4 TIMES PER YEAR

## 2025-08-21 SDOH — SOCIAL STABILITY: SOCIAL INSECURITY: HAVE YOU HAD THOUGHTS OF HARMING ANYONE ELSE?: NO

## 2025-08-21 SDOH — ECONOMIC STABILITY: INCOME INSECURITY: IN THE PAST 12 MONTHS HAS THE ELECTRIC, GAS, OIL, OR WATER COMPANY THREATENED TO SHUT OFF SERVICES IN YOUR HOME?: NO

## 2025-08-21 SDOH — SOCIAL STABILITY: SOCIAL INSECURITY: WITHIN THE LAST YEAR, HAVE YOU BEEN AFRAID OF YOUR PARTNER OR EX-PARTNER?: NO

## 2025-08-21 SDOH — SOCIAL STABILITY: SOCIAL INSECURITY: ARE YOU OR HAVE YOU BEEN THREATENED OR ABUSED PHYSICALLY, EMOTIONALLY, OR SEXUALLY BY ANYONE?: NO

## 2025-08-21 SDOH — HEALTH STABILITY: PHYSICAL HEALTH
HOW OFTEN DO YOU NEED TO HAVE SOMEONE HELP YOU WHEN YOU READ INSTRUCTIONS, PAMPHLETS, OR OTHER WRITTEN MATERIAL FROM YOUR DOCTOR OR PHARMACY?: RARELY

## 2025-08-21 SDOH — HEALTH STABILITY: PHYSICAL HEALTH: ON AVERAGE, HOW MANY MINUTES DO YOU ENGAGE IN EXERCISE AT THIS LEVEL?: 0 MIN

## 2025-08-21 SDOH — SOCIAL STABILITY: SOCIAL NETWORK: HOW OFTEN DO YOU ATTEND MEETINGS OF THE CLUBS OR ORGANIZATIONS YOU BELONG TO?: NEVER

## 2025-08-21 SDOH — SOCIAL STABILITY: SOCIAL INSECURITY: ABUSE: ADULT

## 2025-08-21 SDOH — ECONOMIC STABILITY: HOUSING INSECURITY: IN THE PAST 12 MONTHS, HOW MANY TIMES HAVE YOU MOVED WHERE YOU WERE LIVING?: 0

## 2025-08-21 SDOH — ECONOMIC STABILITY: TRANSPORTATION INSECURITY: IN THE PAST 12 MONTHS, HAS LACK OF TRANSPORTATION KEPT YOU FROM MEDICAL APPOINTMENTS OR FROM GETTING MEDICATIONS?: NO

## 2025-08-21 SDOH — SOCIAL STABILITY: SOCIAL INSECURITY: ARE THERE ANY APPARENT SIGNS OF INJURIES/BEHAVIORS THAT COULD BE RELATED TO ABUSE/NEGLECT?: NO

## 2025-08-21 SDOH — SOCIAL STABILITY: SOCIAL INSECURITY: HAS ANYONE EVER THREATENED TO HURT YOUR FAMILY OR YOUR PETS?: NO

## 2025-08-21 SDOH — SOCIAL STABILITY: SOCIAL INSECURITY: DO YOU FEEL UNSAFE GOING BACK TO THE PLACE WHERE YOU ARE LIVING?: NO

## 2025-08-21 SDOH — SOCIAL STABILITY: SOCIAL INSECURITY: DOES ANYONE TRY TO KEEP YOU FROM HAVING/CONTACTING OTHER FRIENDS OR DOING THINGS OUTSIDE YOUR HOME?: NO

## 2025-08-21 SDOH — SOCIAL STABILITY: SOCIAL INSECURITY: WERE YOU ABLE TO COMPLETE ALL THE BEHAVIORAL HEALTH SCREENINGS?: YES

## 2025-08-21 SDOH — SOCIAL STABILITY: SOCIAL INSECURITY: HAVE YOU HAD ANY THOUGHTS OF HARMING ANYONE ELSE?: NO

## 2025-08-21 ASSESSMENT — LIFESTYLE VARIABLES
AUDIT-C TOTAL SCORE: 1
SKIP TO QUESTIONS 9-10: 1
AUDIT-C TOTAL SCORE: 1
HOW OFTEN DO YOU HAVE A DRINK CONTAINING ALCOHOL: MONTHLY OR LESS
HOW OFTEN DO YOU HAVE 6 OR MORE DRINKS ON ONE OCCASION: NEVER
SUBSTANCE_ABUSE_PAST_12_MONTHS: NO
PRESCIPTION_ABUSE_PAST_12_MONTHS: NO
HOW MANY STANDARD DRINKS CONTAINING ALCOHOL DO YOU HAVE ON A TYPICAL DAY: 1 OR 2

## 2025-08-21 ASSESSMENT — COGNITIVE AND FUNCTIONAL STATUS - GENERAL
TOILETING: A LITTLE
TURNING FROM BACK TO SIDE WHILE IN FLAT BAD: A LITTLE
HELP NEEDED FOR BATHING: A LITTLE
MOBILITY SCORE: 18
PERSONAL GROOMING: A LITTLE
CLIMB 3 TO 5 STEPS WITH RAILING: A LOT
DRESSING REGULAR LOWER BODY CLOTHING: A LITTLE
DAILY ACTIVITIY SCORE: 19
MOVING TO AND FROM BED TO CHAIR: A LITTLE
STANDING UP FROM CHAIR USING ARMS: A LITTLE
DRESSING REGULAR UPPER BODY CLOTHING: A LITTLE
WALKING IN HOSPITAL ROOM: A LITTLE
PATIENT BASELINE BEDBOUND: NO

## 2025-08-21 ASSESSMENT — ACTIVITIES OF DAILY LIVING (ADL)
LACK_OF_TRANSPORTATION: NO
FEEDING YOURSELF: INDEPENDENT
BATHING: NEEDS ASSISTANCE
ADEQUATE_TO_COMPLETE_ADL: YES
HEARING - RIGHT EAR: FUNCTIONAL
HEARING - LEFT EAR: FUNCTIONAL
LACK_OF_TRANSPORTATION: NO
TOILETING: NEEDS ASSISTANCE
JUDGMENT_ADEQUATE_SAFELY_COMPLETE_DAILY_ACTIVITIES: YES
PATIENT'S MEMORY ADEQUATE TO SAFELY COMPLETE DAILY ACTIVITIES?: YES
DRESSING YOURSELF: NEEDS ASSISTANCE
GROOMING: NEEDS ASSISTANCE
WALKS IN HOME: NEEDS ASSISTANCE
ASSISTIVE_DEVICE: EYEGLASSES;WALKER;WHEELCHAIR

## 2025-08-21 ASSESSMENT — PATIENT HEALTH QUESTIONNAIRE - PHQ9
2. FEELING DOWN, DEPRESSED OR HOPELESS: SEVERAL DAYS
SUM OF ALL RESPONSES TO PHQ9 QUESTIONS 1 & 2: 2
1. LITTLE INTEREST OR PLEASURE IN DOING THINGS: SEVERAL DAYS

## 2025-08-21 ASSESSMENT — PAIN SCALES - GENERAL
PAINLEVEL_OUTOF10: 0 - NO PAIN
PAINLEVEL_OUTOF10: 0 - NO PAIN
PAINLEVEL_OUTOF10: 3

## 2025-08-21 ASSESSMENT — PAIN - FUNCTIONAL ASSESSMENT
PAIN_FUNCTIONAL_ASSESSMENT: 0-10
PAIN_FUNCTIONAL_ASSESSMENT: 0-10

## 2025-08-22 LAB
ANION GAP SERPL CALCULATED.3IONS-SCNC: 10 MMOL/L (ref 10–20)
BUN SERPL-MCNC: 11 MG/DL (ref 6–23)
CALCIUM SERPL-MCNC: 8.9 MG/DL (ref 8.6–10.3)
CHLORIDE SERPL-SCNC: 105 MMOL/L (ref 98–107)
CO2 SERPL-SCNC: 29 MMOL/L (ref 21–32)
CREAT SERPL-MCNC: 0.88 MG/DL (ref 0.5–1.05)
EGFRCR SERPLBLD CKD-EPI 2021: 78 ML/MIN/1.73M*2
GLUCOSE SERPL-MCNC: 102 MG/DL (ref 74–99)
HOLD SPECIMEN: NORMAL
POTASSIUM SERPL-SCNC: 4.2 MMOL/L (ref 3.5–5.3)
SODIUM SERPL-SCNC: 140 MMOL/L (ref 136–145)

## 2025-08-22 PROCEDURE — 99233 SBSQ HOSP IP/OBS HIGH 50: CPT | Performed by: INTERNAL MEDICINE

## 2025-08-22 PROCEDURE — 2500000005 HC RX 250 GENERAL PHARMACY W/O HCPCS: Performed by: STUDENT IN AN ORGANIZED HEALTH CARE EDUCATION/TRAINING PROGRAM

## 2025-08-22 PROCEDURE — 97161 PT EVAL LOW COMPLEX 20 MIN: CPT | Mod: GP | Performed by: PHYSICAL THERAPIST

## 2025-08-22 PROCEDURE — 2500000002 HC RX 250 W HCPCS SELF ADMINISTERED DRUGS (ALT 637 FOR MEDICARE OP, ALT 636 FOR OP/ED): Performed by: STUDENT IN AN ORGANIZED HEALTH CARE EDUCATION/TRAINING PROGRAM

## 2025-08-22 PROCEDURE — 80048 BASIC METABOLIC PNL TOTAL CA: CPT | Performed by: INTERNAL MEDICINE

## 2025-08-22 PROCEDURE — 2500000001 HC RX 250 WO HCPCS SELF ADMINISTERED DRUGS (ALT 637 FOR MEDICARE OP): Performed by: INTERNAL MEDICINE

## 2025-08-22 PROCEDURE — 2500000001 HC RX 250 WO HCPCS SELF ADMINISTERED DRUGS (ALT 637 FOR MEDICARE OP): Performed by: STUDENT IN AN ORGANIZED HEALTH CARE EDUCATION/TRAINING PROGRAM

## 2025-08-22 PROCEDURE — 2500000002 HC RX 250 W HCPCS SELF ADMINISTERED DRUGS (ALT 637 FOR MEDICARE OP, ALT 636 FOR OP/ED): Performed by: INTERNAL MEDICINE

## 2025-08-22 PROCEDURE — 36415 COLL VENOUS BLD VENIPUNCTURE: CPT | Performed by: INTERNAL MEDICINE

## 2025-08-22 PROCEDURE — G0378 HOSPITAL OBSERVATION PER HR: HCPCS

## 2025-08-22 PROCEDURE — 96372 THER/PROPH/DIAG INJ SC/IM: CPT | Performed by: STUDENT IN AN ORGANIZED HEALTH CARE EDUCATION/TRAINING PROGRAM

## 2025-08-22 PROCEDURE — 97530 THERAPEUTIC ACTIVITIES: CPT | Mod: GP | Performed by: PHYSICAL THERAPIST

## 2025-08-22 PROCEDURE — 2500000004 HC RX 250 GENERAL PHARMACY W/ HCPCS (ALT 636 FOR OP/ED): Performed by: STUDENT IN AN ORGANIZED HEALTH CARE EDUCATION/TRAINING PROGRAM

## 2025-08-22 PROCEDURE — 97166 OT EVAL MOD COMPLEX 45 MIN: CPT | Mod: GO

## 2025-08-22 RX ORDER — OXYCODONE HYDROCHLORIDE 5 MG/1
5 TABLET ORAL EVERY 6 HOURS PRN
Refills: 0 | Status: DISCONTINUED | OUTPATIENT
Start: 2025-08-22 | End: 2025-08-25 | Stop reason: HOSPADM

## 2025-08-22 RX ORDER — CARVEDILOL 6.25 MG/1
6.25 TABLET ORAL 2 TIMES DAILY
Status: DISCONTINUED | OUTPATIENT
Start: 2025-08-22 | End: 2025-08-25 | Stop reason: HOSPADM

## 2025-08-22 RX ORDER — TIZANIDINE 2 MG/1
2 TABLET ORAL EVERY 6 HOURS PRN
Status: DISCONTINUED | OUTPATIENT
Start: 2025-08-22 | End: 2025-08-25 | Stop reason: HOSPADM

## 2025-08-22 RX ADMIN — Medication 6 MG: at 21:31

## 2025-08-22 RX ADMIN — LAMOTRIGINE 100 MG: 100 TABLET ORAL at 08:12

## 2025-08-22 RX ADMIN — OXYCODONE HYDROCHLORIDE 5 MG: 5 TABLET ORAL at 15:17

## 2025-08-22 RX ADMIN — HEPARIN SODIUM 7500 UNITS: 5000 INJECTION INTRAVENOUS; SUBCUTANEOUS at 15:14

## 2025-08-22 RX ADMIN — SPIRONOLACTONE 12.5 MG: 25 TABLET ORAL at 08:12

## 2025-08-22 RX ADMIN — BUSPIRONE HYDROCHLORIDE 10 MG: 10 TABLET ORAL at 08:12

## 2025-08-22 RX ADMIN — ACETAMINOPHEN 650 MG: 325 TABLET ORAL at 03:35

## 2025-08-22 RX ADMIN — OXYCODONE HYDROCHLORIDE 5 MG: 5 TABLET ORAL at 08:32

## 2025-08-22 RX ADMIN — ATORVASTATIN CALCIUM 80 MG: 80 TABLET, FILM COATED ORAL at 08:12

## 2025-08-22 RX ADMIN — BUSPIRONE HYDROCHLORIDE 10 MG: 10 TABLET ORAL at 21:31

## 2025-08-22 RX ADMIN — EZETIMIBE 10 MG: 10 TABLET ORAL at 08:12

## 2025-08-22 RX ADMIN — HEPARIN SODIUM 7500 UNITS: 5000 INJECTION INTRAVENOUS; SUBCUTANEOUS at 06:14

## 2025-08-22 RX ADMIN — CARVEDILOL 6.25 MG: 6.25 TABLET, FILM COATED ORAL at 21:31

## 2025-08-22 RX ADMIN — LISINOPRIL 10 MG: 10 TABLET ORAL at 21:31

## 2025-08-22 RX ADMIN — HEPARIN SODIUM 7500 UNITS: 5000 INJECTION INTRAVENOUS; SUBCUTANEOUS at 21:32

## 2025-08-22 RX ADMIN — TIZANIDINE 2 MG: 2 TABLET ORAL at 08:32

## 2025-08-22 RX ADMIN — OXYCODONE HYDROCHLORIDE 5 MG: 5 TABLET ORAL at 21:31

## 2025-08-22 RX ADMIN — DULOXETINE 60 MG: 60 CAPSULE, DELAYED RELEASE ORAL at 08:12

## 2025-08-22 ASSESSMENT — COGNITIVE AND FUNCTIONAL STATUS - GENERAL
DAILY ACTIVITIY SCORE: 18
TOILETING: TOTAL
MOVING FROM LYING ON BACK TO SITTING ON SIDE OF FLAT BED WITH BEDRAILS: A LOT
MOBILITY SCORE: 8
MOBILITY SCORE: 19
DAILY ACTIVITIY SCORE: 12
WALKING IN HOSPITAL ROOM: TOTAL
DRESSING REGULAR LOWER BODY CLOTHING: A LOT
TURNING FROM BACK TO SIDE WHILE IN FLAT BAD: TOTAL
MOVING TO AND FROM BED TO CHAIR: A LITTLE
CLIMB 3 TO 5 STEPS WITH RAILING: A LOT
CLIMB 3 TO 5 STEPS WITH RAILING: TOTAL
DRESSING REGULAR LOWER BODY CLOTHING: TOTAL
MOVING TO AND FROM BED TO CHAIR: TOTAL
STANDING UP FROM CHAIR USING ARMS: A LITTLE
TOILETING: A LITTLE
DRESSING REGULAR UPPER BODY CLOTHING: A LOT
STANDING UP FROM CHAIR USING ARMS: A LOT
HELP NEEDED FOR BATHING: A LOT
DRESSING REGULAR UPPER BODY CLOTHING: A LITTLE
WALKING IN HOSPITAL ROOM: A LITTLE
PERSONAL GROOMING: A LOT
HELP NEEDED FOR BATHING: A LOT

## 2025-08-22 ASSESSMENT — PAIN DESCRIPTION - ORIENTATION
ORIENTATION: RIGHT

## 2025-08-22 ASSESSMENT — PAIN SCALES - GENERAL
PAINLEVEL_OUTOF10: 3
PAINLEVEL_OUTOF10: 2
PAINLEVEL_OUTOF10: 4
PAINLEVEL_OUTOF10: 8
PAINLEVEL_OUTOF10: 7

## 2025-08-22 ASSESSMENT — ACTIVITIES OF DAILY LIVING (ADL)
ADL_ASSISTANCE: NEEDS ASSISTANCE
LACK_OF_TRANSPORTATION: NO
BATHING_ASSISTANCE: MAXIMAL
ADL_ASSISTANCE: NEEDS ASSISTANCE

## 2025-08-22 ASSESSMENT — PAIN - FUNCTIONAL ASSESSMENT
PAIN_FUNCTIONAL_ASSESSMENT: 0-10
PAIN_FUNCTIONAL_ASSESSMENT: 0-10
PAIN_FUNCTIONAL_ASSESSMENT: FLACC (FACE, LEGS, ACTIVITY, CRY, CONSOLABILITY)
PAIN_FUNCTIONAL_ASSESSMENT: 0-10

## 2025-08-22 ASSESSMENT — PAIN DESCRIPTION - DESCRIPTORS
DESCRIPTORS: THROBBING
DESCRIPTORS: BURNING

## 2025-08-22 ASSESSMENT — PAIN DESCRIPTION - LOCATION
LOCATION: FOOT

## 2025-08-23 LAB
ANION GAP SERPL CALCULATED.3IONS-SCNC: 10 MMOL/L (ref 10–20)
BUN SERPL-MCNC: 13 MG/DL (ref 6–23)
CALCIUM SERPL-MCNC: 8.6 MG/DL (ref 8.6–10.3)
CHLORIDE SERPL-SCNC: 103 MMOL/L (ref 98–107)
CO2 SERPL-SCNC: 30 MMOL/L (ref 21–32)
CREAT SERPL-MCNC: 0.82 MG/DL (ref 0.5–1.05)
EGFRCR SERPLBLD CKD-EPI 2021: 85 ML/MIN/1.73M*2
GLUCOSE SERPL-MCNC: 96 MG/DL (ref 74–99)
HOLD SPECIMEN: NORMAL
POTASSIUM SERPL-SCNC: 3.9 MMOL/L (ref 3.5–5.3)
SODIUM SERPL-SCNC: 139 MMOL/L (ref 136–145)

## 2025-08-23 PROCEDURE — 82374 ASSAY BLOOD CARBON DIOXIDE: CPT | Performed by: INTERNAL MEDICINE

## 2025-08-23 PROCEDURE — 2500000002 HC RX 250 W HCPCS SELF ADMINISTERED DRUGS (ALT 637 FOR MEDICARE OP, ALT 636 FOR OP/ED): Performed by: STUDENT IN AN ORGANIZED HEALTH CARE EDUCATION/TRAINING PROGRAM

## 2025-08-23 PROCEDURE — 2500000005 HC RX 250 GENERAL PHARMACY W/O HCPCS: Performed by: STUDENT IN AN ORGANIZED HEALTH CARE EDUCATION/TRAINING PROGRAM

## 2025-08-23 PROCEDURE — 2500000004 HC RX 250 GENERAL PHARMACY W/ HCPCS (ALT 636 FOR OP/ED): Performed by: STUDENT IN AN ORGANIZED HEALTH CARE EDUCATION/TRAINING PROGRAM

## 2025-08-23 PROCEDURE — 2500000001 HC RX 250 WO HCPCS SELF ADMINISTERED DRUGS (ALT 637 FOR MEDICARE OP): Performed by: INTERNAL MEDICINE

## 2025-08-23 PROCEDURE — G0378 HOSPITAL OBSERVATION PER HR: HCPCS

## 2025-08-23 PROCEDURE — 36415 COLL VENOUS BLD VENIPUNCTURE: CPT | Performed by: INTERNAL MEDICINE

## 2025-08-23 PROCEDURE — 2500000001 HC RX 250 WO HCPCS SELF ADMINISTERED DRUGS (ALT 637 FOR MEDICARE OP): Performed by: STUDENT IN AN ORGANIZED HEALTH CARE EDUCATION/TRAINING PROGRAM

## 2025-08-23 PROCEDURE — 96372 THER/PROPH/DIAG INJ SC/IM: CPT | Performed by: STUDENT IN AN ORGANIZED HEALTH CARE EDUCATION/TRAINING PROGRAM

## 2025-08-23 PROCEDURE — 99239 HOSP IP/OBS DSCHRG MGMT >30: CPT | Performed by: INTERNAL MEDICINE

## 2025-08-23 RX ORDER — CARVEDILOL 6.25 MG/1
6.25 TABLET ORAL 2 TIMES DAILY
Start: 2025-08-23 | End: 2025-09-22

## 2025-08-23 RX ORDER — POLYETHYLENE GLYCOL 3350 17 G/17G
17 POWDER, FOR SOLUTION ORAL DAILY PRN
Start: 2025-08-23 | End: 2025-08-30

## 2025-08-23 RX ORDER — OXYCODONE HYDROCHLORIDE 5 MG/1
5 TABLET ORAL EVERY 6 HOURS PRN
Qty: 12 TABLET | Refills: 0 | Status: SHIPPED | OUTPATIENT
Start: 2025-08-23 | End: 2025-08-26

## 2025-08-23 RX ORDER — PYRIDOXINE HCL (VITAMIN B6) 100 MG
100 TABLET ORAL EVERY 12 HOURS
Start: 2025-08-23 | End: 2025-09-22

## 2025-08-23 RX ORDER — LANOLIN ALCOHOL/MO/W.PET/CERES
500 CREAM (GRAM) TOPICAL DAILY
Status: DISCONTINUED | OUTPATIENT
Start: 2025-08-23 | End: 2025-08-23

## 2025-08-23 RX ORDER — LANOLIN ALCOHOL/MO/W.PET/CERES
500 CREAM (GRAM) TOPICAL DAILY
Status: CANCELLED | OUTPATIENT
Start: 2025-08-23

## 2025-08-23 RX ORDER — TIZANIDINE 2 MG/1
2 TABLET ORAL EVERY 6 HOURS PRN
Start: 2025-08-23 | End: 2025-08-30

## 2025-08-23 RX ORDER — CEPHALEXIN 500 MG/1
500 CAPSULE ORAL EVERY 6 HOURS SCHEDULED
Status: DISCONTINUED | OUTPATIENT
Start: 2025-08-23 | End: 2025-08-25 | Stop reason: HOSPADM

## 2025-08-23 RX ORDER — LANOLIN ALCOHOL/MO/W.PET/CERES
100 CREAM (GRAM) TOPICAL DAILY
Status: DISCONTINUED | OUTPATIENT
Start: 2025-08-23 | End: 2025-08-23

## 2025-08-23 RX ORDER — LANOLIN ALCOHOL/MO/W.PET/CERES
100 CREAM (GRAM) TOPICAL EVERY 12 HOURS
Status: DISCONTINUED | OUTPATIENT
Start: 2025-08-23 | End: 2025-08-25 | Stop reason: HOSPADM

## 2025-08-23 RX ORDER — ACETAMINOPHEN 325 MG/1
650 TABLET ORAL EVERY 6 HOURS PRN
Start: 2025-08-23 | End: 2025-08-28

## 2025-08-23 RX ADMIN — SPIRONOLACTONE 12.5 MG: 25 TABLET ORAL at 09:44

## 2025-08-23 RX ADMIN — CEPHALEXIN 500 MG: 500 CAPSULE ORAL at 18:37

## 2025-08-23 RX ADMIN — Medication 6 MG: at 20:27

## 2025-08-23 RX ADMIN — ACETAMINOPHEN 650 MG: 325 TABLET ORAL at 04:52

## 2025-08-23 RX ADMIN — ATORVASTATIN CALCIUM 80 MG: 80 TABLET, FILM COATED ORAL at 09:44

## 2025-08-23 RX ADMIN — BUSPIRONE HYDROCHLORIDE 10 MG: 10 TABLET ORAL at 20:27

## 2025-08-23 RX ADMIN — CEPHALEXIN 500 MG: 500 CAPSULE ORAL at 23:19

## 2025-08-23 RX ADMIN — HEPARIN SODIUM 7500 UNITS: 5000 INJECTION INTRAVENOUS; SUBCUTANEOUS at 06:04

## 2025-08-23 RX ADMIN — ACETAMINOPHEN 650 MG: 325 TABLET ORAL at 20:36

## 2025-08-23 RX ADMIN — CEPHALEXIN 500 MG: 500 CAPSULE ORAL at 12:05

## 2025-08-23 RX ADMIN — HEPARIN SODIUM 7500 UNITS: 5000 INJECTION INTRAVENOUS; SUBCUTANEOUS at 20:36

## 2025-08-23 RX ADMIN — CARVEDILOL 6.25 MG: 6.25 TABLET, FILM COATED ORAL at 09:44

## 2025-08-23 RX ADMIN — PYRIDOXINE HCL TAB 50 MG 100 MG: 50 TAB at 12:05

## 2025-08-23 RX ADMIN — EZETIMIBE 10 MG: 10 TABLET ORAL at 09:44

## 2025-08-23 RX ADMIN — BUSPIRONE HYDROCHLORIDE 10 MG: 10 TABLET ORAL at 09:44

## 2025-08-23 RX ADMIN — CARVEDILOL 6.25 MG: 6.25 TABLET, FILM COATED ORAL at 20:27

## 2025-08-23 RX ADMIN — LAMOTRIGINE 100 MG: 100 TABLET ORAL at 09:44

## 2025-08-23 RX ADMIN — LISINOPRIL 10 MG: 10 TABLET ORAL at 20:27

## 2025-08-23 RX ADMIN — DULOXETINE 60 MG: 60 CAPSULE, DELAYED RELEASE ORAL at 09:44

## 2025-08-23 RX ADMIN — PYRIDOXINE HCL TAB 50 MG 100 MG: 50 TAB at 20:36

## 2025-08-23 ASSESSMENT — COGNITIVE AND FUNCTIONAL STATUS - GENERAL
WALKING IN HOSPITAL ROOM: A LITTLE
TOILETING: A LITTLE
MOBILITY SCORE: 19
DRESSING REGULAR UPPER BODY CLOTHING: A LITTLE
DRESSING REGULAR LOWER BODY CLOTHING: A LITTLE
WALKING IN HOSPITAL ROOM: A LITTLE
MOVING TO AND FROM BED TO CHAIR: A LITTLE
STANDING UP FROM CHAIR USING ARMS: A LITTLE
STANDING UP FROM CHAIR USING ARMS: A LITTLE
HELP NEEDED FOR BATHING: A LOT
MOBILITY SCORE: 19
DRESSING REGULAR LOWER BODY CLOTHING: A LITTLE
MOVING TO AND FROM BED TO CHAIR: A LITTLE
TOILETING: A LITTLE
HELP NEEDED FOR BATHING: A LITTLE
STANDING UP FROM CHAIR USING ARMS: A LITTLE
DAILY ACTIVITIY SCORE: 18
TOILETING: A LITTLE
CLIMB 3 TO 5 STEPS WITH RAILING: A LOT
MOBILITY SCORE: 19
DRESSING REGULAR UPPER BODY CLOTHING: A LITTLE
CLIMB 3 TO 5 STEPS WITH RAILING: A LOT
DRESSING REGULAR LOWER BODY CLOTHING: A LOT
MOVING TO AND FROM BED TO CHAIR: A LITTLE
DAILY ACTIVITIY SCORE: 20
WALKING IN HOSPITAL ROOM: A LITTLE
HELP NEEDED FOR BATHING: A LITTLE
DAILY ACTIVITIY SCORE: 20
DRESSING REGULAR UPPER BODY CLOTHING: A LITTLE
CLIMB 3 TO 5 STEPS WITH RAILING: A LOT

## 2025-08-23 ASSESSMENT — PAIN SCALES - GENERAL
PAINLEVEL_OUTOF10: 0 - NO PAIN
PAINLEVEL_OUTOF10: 3
PAINLEVEL_OUTOF10: 0 - NO PAIN
PAINLEVEL_OUTOF10: 3

## 2025-08-23 ASSESSMENT — PAIN - FUNCTIONAL ASSESSMENT
PAIN_FUNCTIONAL_ASSESSMENT: 0-10

## 2025-08-23 ASSESSMENT — PAIN DESCRIPTION - DESCRIPTORS
DESCRIPTORS: ACHING
DESCRIPTORS: ACHING

## 2025-08-23 ASSESSMENT — PAIN DESCRIPTION - LOCATION: LOCATION: BACK

## 2025-08-24 VITALS
RESPIRATION RATE: 18 BRPM | HEIGHT: 63 IN | TEMPERATURE: 97.5 F | BODY MASS INDEX: 46.95 KG/M2 | WEIGHT: 265 LBS | SYSTOLIC BLOOD PRESSURE: 123 MMHG | DIASTOLIC BLOOD PRESSURE: 57 MMHG | OXYGEN SATURATION: 93 % | HEART RATE: 84 BPM

## 2025-08-24 LAB
ANION GAP SERPL CALCULATED.3IONS-SCNC: 8 MMOL/L (ref 10–20)
BUN SERPL-MCNC: 12 MG/DL (ref 6–23)
CALCIUM SERPL-MCNC: 9.5 MG/DL (ref 8.6–10.3)
CHLORIDE SERPL-SCNC: 102 MMOL/L (ref 98–107)
CO2 SERPL-SCNC: 35 MMOL/L (ref 21–32)
CREAT SERPL-MCNC: 0.87 MG/DL (ref 0.5–1.05)
EGFRCR SERPLBLD CKD-EPI 2021: 79 ML/MIN/1.73M*2
GLUCOSE SERPL-MCNC: 105 MG/DL (ref 74–99)
HOLD SPECIMEN: NORMAL
POTASSIUM SERPL-SCNC: 4.2 MMOL/L (ref 3.5–5.3)
SODIUM SERPL-SCNC: 141 MMOL/L (ref 136–145)

## 2025-08-24 PROCEDURE — 99232 SBSQ HOSP IP/OBS MODERATE 35: CPT | Performed by: INTERNAL MEDICINE

## 2025-08-24 PROCEDURE — 2500000001 HC RX 250 WO HCPCS SELF ADMINISTERED DRUGS (ALT 637 FOR MEDICARE OP): Performed by: INTERNAL MEDICINE

## 2025-08-24 PROCEDURE — 96372 THER/PROPH/DIAG INJ SC/IM: CPT | Performed by: STUDENT IN AN ORGANIZED HEALTH CARE EDUCATION/TRAINING PROGRAM

## 2025-08-24 PROCEDURE — 36415 COLL VENOUS BLD VENIPUNCTURE: CPT | Performed by: INTERNAL MEDICINE

## 2025-08-24 PROCEDURE — 2500000001 HC RX 250 WO HCPCS SELF ADMINISTERED DRUGS (ALT 637 FOR MEDICARE OP): Performed by: STUDENT IN AN ORGANIZED HEALTH CARE EDUCATION/TRAINING PROGRAM

## 2025-08-24 PROCEDURE — 80048 BASIC METABOLIC PNL TOTAL CA: CPT | Performed by: INTERNAL MEDICINE

## 2025-08-24 PROCEDURE — G0378 HOSPITAL OBSERVATION PER HR: HCPCS

## 2025-08-24 PROCEDURE — 2500000002 HC RX 250 W HCPCS SELF ADMINISTERED DRUGS (ALT 637 FOR MEDICARE OP, ALT 636 FOR OP/ED): Performed by: STUDENT IN AN ORGANIZED HEALTH CARE EDUCATION/TRAINING PROGRAM

## 2025-08-24 PROCEDURE — 2500000004 HC RX 250 GENERAL PHARMACY W/ HCPCS (ALT 636 FOR OP/ED): Performed by: STUDENT IN AN ORGANIZED HEALTH CARE EDUCATION/TRAINING PROGRAM

## 2025-08-24 RX ORDER — MIRTAZAPINE 15 MG/1
7.5 TABLET, FILM COATED ORAL NIGHTLY
Status: DISCONTINUED | OUTPATIENT
Start: 2025-08-24 | End: 2025-08-25 | Stop reason: HOSPADM

## 2025-08-24 RX ADMIN — CEPHALEXIN 500 MG: 500 CAPSULE ORAL at 06:27

## 2025-08-24 RX ADMIN — BUSPIRONE HYDROCHLORIDE 10 MG: 10 TABLET ORAL at 08:31

## 2025-08-24 RX ADMIN — CEPHALEXIN 500 MG: 500 CAPSULE ORAL at 13:17

## 2025-08-24 RX ADMIN — CARVEDILOL 6.25 MG: 6.25 TABLET, FILM COATED ORAL at 08:31

## 2025-08-24 RX ADMIN — HEPARIN SODIUM 7500 UNITS: 5000 INJECTION INTRAVENOUS; SUBCUTANEOUS at 13:17

## 2025-08-24 RX ADMIN — BUSPIRONE HYDROCHLORIDE 10 MG: 10 TABLET ORAL at 22:04

## 2025-08-24 RX ADMIN — MIRTAZAPINE 7.5 MG: 15 TABLET, FILM COATED ORAL at 22:03

## 2025-08-24 RX ADMIN — HEPARIN SODIUM 7500 UNITS: 5000 INJECTION INTRAVENOUS; SUBCUTANEOUS at 22:04

## 2025-08-24 RX ADMIN — LAMOTRIGINE 100 MG: 100 TABLET ORAL at 08:31

## 2025-08-24 RX ADMIN — LISINOPRIL 10 MG: 10 TABLET ORAL at 22:02

## 2025-08-24 RX ADMIN — CARVEDILOL 6.25 MG: 6.25 TABLET, FILM COATED ORAL at 22:04

## 2025-08-24 RX ADMIN — PYRIDOXINE HCL TAB 50 MG 100 MG: 50 TAB at 22:02

## 2025-08-24 RX ADMIN — PYRIDOXINE HCL TAB 50 MG 100 MG: 50 TAB at 08:31

## 2025-08-24 RX ADMIN — EZETIMIBE 10 MG: 10 TABLET ORAL at 08:31

## 2025-08-24 RX ADMIN — DULOXETINE 60 MG: 60 CAPSULE, DELAYED RELEASE ORAL at 08:31

## 2025-08-24 RX ADMIN — SPIRONOLACTONE 12.5 MG: 25 TABLET ORAL at 08:31

## 2025-08-24 RX ADMIN — CEPHALEXIN 500 MG: 500 CAPSULE ORAL at 18:16

## 2025-08-24 RX ADMIN — ATORVASTATIN CALCIUM 80 MG: 80 TABLET, FILM COATED ORAL at 08:31

## 2025-08-24 ASSESSMENT — COGNITIVE AND FUNCTIONAL STATUS - GENERAL
WALKING IN HOSPITAL ROOM: A LITTLE
DRESSING REGULAR LOWER BODY CLOTHING: A LITTLE
HELP NEEDED FOR BATHING: A LITTLE
DAILY ACTIVITIY SCORE: 20
DRESSING REGULAR LOWER BODY CLOTHING: A LITTLE
MOBILITY SCORE: 19
CLIMB 3 TO 5 STEPS WITH RAILING: A LOT
HELP NEEDED FOR BATHING: A LITTLE
DAILY ACTIVITIY SCORE: 20
DRESSING REGULAR UPPER BODY CLOTHING: A LITTLE
MOVING TO AND FROM BED TO CHAIR: A LITTLE
CLIMB 3 TO 5 STEPS WITH RAILING: A LOT
WALKING IN HOSPITAL ROOM: A LITTLE
STANDING UP FROM CHAIR USING ARMS: A LITTLE
TOILETING: A LITTLE
STANDING UP FROM CHAIR USING ARMS: A LITTLE
TOILETING: A LITTLE
MOBILITY SCORE: 19
DRESSING REGULAR UPPER BODY CLOTHING: A LITTLE
MOVING TO AND FROM BED TO CHAIR: A LITTLE

## 2025-08-24 ASSESSMENT — PAIN SCALES - GENERAL
PAINLEVEL_OUTOF10: 0 - NO PAIN
PAINLEVEL_OUTOF10: 0 - NO PAIN

## 2025-08-24 ASSESSMENT — PAIN - FUNCTIONAL ASSESSMENT
PAIN_FUNCTIONAL_ASSESSMENT: 0-10
PAIN_FUNCTIONAL_ASSESSMENT: 0-10

## 2025-08-25 ENCOUNTER — APPOINTMENT (OUTPATIENT)
Dept: RADIOLOGY | Facility: HOSPITAL | Age: 55
End: 2025-08-25
Payer: MEDICARE

## 2025-08-25 ENCOUNTER — APPOINTMENT (OUTPATIENT)
Dept: CARDIOLOGY | Facility: HOSPITAL | Age: 55
End: 2025-08-25
Payer: MEDICARE

## 2025-08-25 VITALS
HEART RATE: 81 BPM | SYSTOLIC BLOOD PRESSURE: 109 MMHG | DIASTOLIC BLOOD PRESSURE: 65 MMHG | TEMPERATURE: 97.9 F | WEIGHT: 265 LBS | RESPIRATION RATE: 18 BRPM | HEIGHT: 63 IN | BODY MASS INDEX: 46.95 KG/M2 | OXYGEN SATURATION: 94 %
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
## 2025-08-25 LAB
ANION GAP SERPL CALCULATED.3IONS-SCNC: 10 MMOL/L (ref 10–20)
BUN SERPL-MCNC: 12 MG/DL (ref 6–23)
CALCIUM SERPL-MCNC: 9.5 MG/DL (ref 8.6–10.3)
CHLORIDE SERPL-SCNC: 101 MMOL/L (ref 98–107)
CO2 SERPL-SCNC: 33 MMOL/L (ref 21–32)
CREAT SERPL-MCNC: 0.88 MG/DL (ref 0.5–1.05)
EGFRCR SERPLBLD CKD-EPI 2021: 78 ML/MIN/1.73M*2
GLUCOSE SERPL-MCNC: 113 MG/DL (ref 74–99)
HOLD SPECIMEN: NORMAL
POTASSIUM SERPL-SCNC: 4.3 MMOL/L (ref 3.5–5.3)
SODIUM SERPL-SCNC: 140 MMOL/L (ref 136–145)

## 2025-08-25 PROCEDURE — 93005 ELECTROCARDIOGRAM TRACING: CPT

## 2025-08-25 PROCEDURE — 97110 THERAPEUTIC EXERCISES: CPT | Mod: GP

## 2025-08-25 PROCEDURE — 97535 SELF CARE MNGMENT TRAINING: CPT | Mod: GO,CO

## 2025-08-25 PROCEDURE — 97116 GAIT TRAINING THERAPY: CPT | Mod: GP

## 2025-08-25 PROCEDURE — 2500000004 HC RX 250 GENERAL PHARMACY W/ HCPCS (ALT 636 FOR OP/ED): Performed by: STUDENT IN AN ORGANIZED HEALTH CARE EDUCATION/TRAINING PROGRAM

## 2025-08-25 PROCEDURE — 2500000002 HC RX 250 W HCPCS SELF ADMINISTERED DRUGS (ALT 637 FOR MEDICARE OP, ALT 636 FOR OP/ED): Performed by: INTERNAL MEDICINE

## 2025-08-25 PROCEDURE — 2500000001 HC RX 250 WO HCPCS SELF ADMINISTERED DRUGS (ALT 637 FOR MEDICARE OP): Performed by: INTERNAL MEDICINE

## 2025-08-25 PROCEDURE — 80048 BASIC METABOLIC PNL TOTAL CA: CPT | Performed by: INTERNAL MEDICINE

## 2025-08-25 PROCEDURE — 2500000002 HC RX 250 W HCPCS SELF ADMINISTERED DRUGS (ALT 637 FOR MEDICARE OP, ALT 636 FOR OP/ED): Performed by: STUDENT IN AN ORGANIZED HEALTH CARE EDUCATION/TRAINING PROGRAM

## 2025-08-25 PROCEDURE — 73630 X-RAY EXAM OF FOOT: CPT | Mod: RIGHT SIDE | Performed by: RADIOLOGY

## 2025-08-25 PROCEDURE — 36415 COLL VENOUS BLD VENIPUNCTURE: CPT | Performed by: INTERNAL MEDICINE

## 2025-08-25 PROCEDURE — G0378 HOSPITAL OBSERVATION PER HR: HCPCS

## 2025-08-25 PROCEDURE — 97530 THERAPEUTIC ACTIVITIES: CPT | Mod: GO,CO

## 2025-08-25 PROCEDURE — 2500000001 HC RX 250 WO HCPCS SELF ADMINISTERED DRUGS (ALT 637 FOR MEDICARE OP): Performed by: STUDENT IN AN ORGANIZED HEALTH CARE EDUCATION/TRAINING PROGRAM

## 2025-08-25 PROCEDURE — 96372 THER/PROPH/DIAG INJ SC/IM: CPT | Performed by: STUDENT IN AN ORGANIZED HEALTH CARE EDUCATION/TRAINING PROGRAM

## 2025-08-25 PROCEDURE — 73630 X-RAY EXAM OF FOOT: CPT | Mod: RT

## 2025-08-25 PROCEDURE — 99239 HOSP IP/OBS DSCHRG MGMT >30: CPT | Performed by: INTERNAL MEDICINE

## 2025-08-25 RX ADMIN — CEPHALEXIN 500 MG: 500 CAPSULE ORAL at 05:59

## 2025-08-25 RX ADMIN — LAMOTRIGINE 100 MG: 100 TABLET ORAL at 09:23

## 2025-08-25 RX ADMIN — TIZANIDINE 2 MG: 2 TABLET ORAL at 03:23

## 2025-08-25 RX ADMIN — DULOXETINE 60 MG: 60 CAPSULE, DELAYED RELEASE ORAL at 09:22

## 2025-08-25 RX ADMIN — HEPARIN SODIUM 7500 UNITS: 5000 INJECTION INTRAVENOUS; SUBCUTANEOUS at 06:00

## 2025-08-25 RX ADMIN — OXYCODONE HYDROCHLORIDE 5 MG: 5 TABLET ORAL at 06:00

## 2025-08-25 RX ADMIN — PYRIDOXINE HCL TAB 50 MG 100 MG: 50 TAB at 09:22

## 2025-08-25 RX ADMIN — CARVEDILOL 6.25 MG: 6.25 TABLET, FILM COATED ORAL at 09:22

## 2025-08-25 RX ADMIN — CEPHALEXIN 500 MG: 500 CAPSULE ORAL at 13:06

## 2025-08-25 RX ADMIN — ATORVASTATIN CALCIUM 80 MG: 80 TABLET, FILM COATED ORAL at 09:23

## 2025-08-25 RX ADMIN — CEPHALEXIN 500 MG: 500 CAPSULE ORAL at 00:19

## 2025-08-25 RX ADMIN — SPIRONOLACTONE 12.5 MG: 25 TABLET ORAL at 09:23

## 2025-08-25 RX ADMIN — EZETIMIBE 10 MG: 10 TABLET ORAL at 09:23

## 2025-08-25 RX ADMIN — BUSPIRONE HYDROCHLORIDE 10 MG: 10 TABLET ORAL at 09:23

## 2025-08-25 ASSESSMENT — PAIN - FUNCTIONAL ASSESSMENT
PAIN_FUNCTIONAL_ASSESSMENT: 0-10

## 2025-08-25 ASSESSMENT — PAIN DESCRIPTION - LOCATION: LOCATION: BACK

## 2025-08-25 ASSESSMENT — COGNITIVE AND FUNCTIONAL STATUS - GENERAL
HELP NEEDED FOR BATHING: A LITTLE
HELP NEEDED FOR BATHING: A LOT
DRESSING REGULAR LOWER BODY CLOTHING: A LITTLE
DAILY ACTIVITIY SCORE: 14
STANDING UP FROM CHAIR USING ARMS: A LITTLE
DRESSING REGULAR UPPER BODY CLOTHING: A LOT
CLIMB 3 TO 5 STEPS WITH RAILING: A LOT
PERSONAL GROOMING: A LITTLE
WALKING IN HOSPITAL ROOM: A LOT
CLIMB 3 TO 5 STEPS WITH RAILING: TOTAL
MOVING FROM LYING ON BACK TO SITTING ON SIDE OF FLAT BED WITH BEDRAILS: A LOT
DRESSING REGULAR LOWER BODY CLOTHING: A LOT
TOILETING: A LITTLE
WALKING IN HOSPITAL ROOM: A LITTLE
TURNING FROM BACK TO SIDE WHILE IN FLAT BAD: A LOT
MOVING TO AND FROM BED TO CHAIR: A LITTLE
DRESSING REGULAR UPPER BODY CLOTHING: A LITTLE
MOVING TO AND FROM BED TO CHAIR: A LOT
DAILY ACTIVITIY SCORE: 20
STANDING UP FROM CHAIR USING ARMS: A LOT
TOILETING: TOTAL
MOBILITY SCORE: 11
MOBILITY SCORE: 19

## 2025-08-25 ASSESSMENT — ACTIVITIES OF DAILY LIVING (ADL)
BATHING_EQUIPMENT_NEEDED: LONG-HANDLED SPONGE
BATHING_WHERE_ASSESSED: OTHER (COMMENT)
HOME_MANAGEMENT_TIME_ENTRY: 24
BATHING_LEVEL_OF_ASSISTANCE: MAXIMUM ASSISTANCE

## 2025-08-25 ASSESSMENT — PAIN DESCRIPTION - ORIENTATION: ORIENTATION: LOWER

## 2025-08-25 ASSESSMENT — PAIN SCALES - GENERAL
PAINLEVEL_OUTOF10: 5 - MODERATE PAIN
PAINLEVEL_OUTOF10: 10 - WORST POSSIBLE PAIN
PAINLEVEL_OUTOF10: 0 - NO PAIN
PAINLEVEL_OUTOF10: 0 - NO PAIN
PAINLEVEL_OUTOF10: 2

## 2025-08-25 ASSESSMENT — PAIN DESCRIPTION - DESCRIPTORS: DESCRIPTORS: ACHING
